# Patient Record
Sex: FEMALE | Race: BLACK OR AFRICAN AMERICAN | NOT HISPANIC OR LATINO | URBAN - METROPOLITAN AREA
[De-identification: names, ages, dates, MRNs, and addresses within clinical notes are randomized per-mention and may not be internally consistent; named-entity substitution may affect disease eponyms.]

---

## 2020-04-18 ENCOUNTER — INPATIENT (INPATIENT)
Facility: HOSPITAL | Age: 70
LOS: 2 days | Discharge: HOME | End: 2020-04-21
Attending: UROLOGY | Admitting: UROLOGY
Payer: COMMERCIAL

## 2020-04-18 VITALS
HEART RATE: 96 BPM | RESPIRATION RATE: 18 BRPM | SYSTOLIC BLOOD PRESSURE: 167 MMHG | OXYGEN SATURATION: 99 % | TEMPERATURE: 98 F | DIASTOLIC BLOOD PRESSURE: 98 MMHG

## 2020-04-18 LAB
BASOPHILS # BLD AUTO: 0.04 K/UL — SIGNIFICANT CHANGE UP (ref 0–0.2)
BASOPHILS NFR BLD AUTO: 0.7 % — SIGNIFICANT CHANGE UP (ref 0–2)
EOSINOPHIL # BLD AUTO: 0.02 K/UL — SIGNIFICANT CHANGE UP (ref 0–0.5)
EOSINOPHIL NFR BLD AUTO: 0.4 % — SIGNIFICANT CHANGE UP (ref 0–6)
HCT VFR BLD CALC: 40.3 % — SIGNIFICANT CHANGE UP (ref 34.5–45)
HGB BLD-MCNC: 12.9 G/DL — SIGNIFICANT CHANGE UP (ref 11.5–15.5)
IMM GRANULOCYTES NFR BLD AUTO: 0.4 % — SIGNIFICANT CHANGE UP (ref 0–1.5)
LYMPHOCYTES # BLD AUTO: 1.34 K/UL — SIGNIFICANT CHANGE UP (ref 1–3.3)
LYMPHOCYTES # BLD AUTO: 23.5 % — SIGNIFICANT CHANGE UP (ref 13–44)
MCHC RBC-ENTMCNC: 27.4 PG — SIGNIFICANT CHANGE UP (ref 27–34)
MCHC RBC-ENTMCNC: 32 % — SIGNIFICANT CHANGE UP (ref 32–36)
MCV RBC AUTO: 85.6 FL — SIGNIFICANT CHANGE UP (ref 80–100)
MONOCYTES # BLD AUTO: 0.47 K/UL — SIGNIFICANT CHANGE UP (ref 0–0.9)
MONOCYTES NFR BLD AUTO: 8.2 % — SIGNIFICANT CHANGE UP (ref 2–14)
NEUTROPHILS # BLD AUTO: 3.82 K/UL — SIGNIFICANT CHANGE UP (ref 1.8–7.4)
NEUTROPHILS NFR BLD AUTO: 66.8 % — SIGNIFICANT CHANGE UP (ref 43–77)
NRBC # FLD: 0 K/UL — SIGNIFICANT CHANGE UP (ref 0–0)
PLATELET # BLD AUTO: 221 K/UL — SIGNIFICANT CHANGE UP (ref 150–400)
PMV BLD: 11.5 FL — SIGNIFICANT CHANGE UP (ref 7–13)
RBC # BLD: 4.71 M/UL — SIGNIFICANT CHANGE UP (ref 3.8–5.2)
RBC # FLD: 15.4 % — HIGH (ref 10.3–14.5)
WBC # BLD: 5.71 K/UL — SIGNIFICANT CHANGE UP (ref 3.8–10.5)
WBC # FLD AUTO: 5.71 K/UL — SIGNIFICANT CHANGE UP (ref 3.8–10.5)

## 2020-04-18 PROCEDURE — 71046 X-RAY EXAM CHEST 2 VIEWS: CPT | Mod: 26

## 2020-04-18 RX ORDER — ASPIRIN/CALCIUM CARB/MAGNESIUM 324 MG
325 TABLET ORAL ONCE
Refills: 0 | Status: COMPLETED | OUTPATIENT
Start: 2020-04-18 | End: 2020-04-18

## 2020-04-18 RX ADMIN — Medication 325 MILLIGRAM(S): at 23:52

## 2020-04-18 NOTE — ED PROVIDER NOTE - PHYSICAL EXAMINATION
Physical Exam:    I have reviewed the triage vital signs.    Gen: NAD, AOx3, non-toxic appearing, able to ambulate without assistance  Head and Neck: NCAT, Neck supple without meningismus   HEENT: EOMI, PEERLA, normal conjunctiva, tongue midline, oral mucosa moist  Lung: CTAB, dyspneic, no wheezes/rhonchi/rales B/L, speaking in short sentences  CV: RRR, no murmurs, rubs or gallops  Abd: soft, NT, ND, no guarding, no rigidity, no rebound tenderness, no CVA tenderness, no masses.   MSK: no gross deformities, ROM normal in UE/LE, no back tenderness  Neuro: CNs II-XII grossly intact. No focal sensory or motor deficits  Skin: Warm, well perfused, no rash, no leg swelling  Psych: Appropriate mood and affect

## 2020-04-18 NOTE — ED ADULT TRIAGE NOTE - CHIEF COMPLAINT QUOTE
Pt arrives to ED c/o dizziness, CP and SOB since Wednesday.  Pt saturating 99% on room air.  EKG in triage.

## 2020-04-18 NOTE — ED PROVIDER NOTE - PROGRESS NOTE DETAILS
Annie HOWARD: pt with mild trop leak with normal creatinine. based on bedside echo showing dilated cardiomyopathy and reduced EF, added on a probnp. CXR showing mild pulmonary edema. No lasix given since patient currently asymptomatic and not hypoxic. d/w hospitalist dr. quintana who agreed with telemetry admission for stress and echo.

## 2020-04-18 NOTE — ED PROVIDER NOTE - RAPID ASSESSMENT
69 y/o female with pmhx of DM, HTN presents to ED c/o chest pain, palpitations, SUBRAMANIAN, dizziness x 3 days. Chest pain radiates down left arm. Gets SOB with exertion. Intermittent pain. Did not take ASA today. No fever, chills, cough. No le edema, calf pain, hx of dvt or pe. Does not have cardiologist. Hx of normal ekg in the past. EKG in ED with left BBB and T wave inversions in lateral leads I, AVF, V6. concern for ACS- will escalate care from RW to main ED, place on tele monitor, check labs, trop, CK and CKMB, cxr, provide ASA. pt assigned to Dr. Nicole Lackey. 69 y/o female with pmhx of DM, HTN presents to ED c/o chest pain, palpitations, SUBRAMANIAN, dizziness x 3 days. Chest pain radiates down left arm. Gets SOB with exertion. Intermittent pain. Did not take ASA today. No fever, chills, cough. No le edema, calf pain, hx of dvt or pe. Does not have cardiologist. Hx of normal ekg in the past. pt hemodynamically stable, well appearing. NAD. EKG in ED with left BBB and T wave inversions in lateral leads I, AVF, V6. concern for ACS- will escalate care from RW to main ED, place on tele monitor, check labs, trop, CK and CKMB, cxr, provide ASA. pt assigned to Dr. Nicole Lackey.

## 2020-04-18 NOTE — ED PROVIDER NOTE - OBJECTIVE STATEMENT
69 y/o female with pmhx of DM, HTN presents to ED c/o chest pain, palpitations, SUBRAMANIAN, dizziness x 3 days. Chest pain radiates down left arm. Gets SOB with exertion. Intermittent pain. Did not take ASA today. No fever, chills, cough. No le edema, calf pain, hx of dvt or pe. Does not have cardiologist. No n/v/d. No fever chills.

## 2020-04-18 NOTE — ED PROVIDER NOTE - ATTENDING CONTRIBUTION TO CARE
Lackey: 70 yof complaining of intermittent chest pain, radiating down the left arm, worse with exertion, occasionally feels sweaty as well and lightheaded. Pt has had no previous workup of stress or echo. Pt takes Metformin and Losartan. Pt initially stated she had syncope but then stated she "came close." As far as pt is aware, pt has a normal EKG. Today shows left bundle, vitals stable and PE is unremarkable as mentioned above. RRR, lungs clear, no pitting edema, no jvd, pulses equal. EKG is abnormal and pt should have a cardiac workup. Labs and CXR pending.

## 2020-04-18 NOTE — ED PROVIDER NOTE - CLINICAL SUMMARY MEDICAL DECISION MAKING FREE TEXT BOX
Bravo: Adult female DM and HTN with chest pain, palpitations, near syncope, SUBRAMANIAN. No old ekg. Never echo or ekg.  Plan: ACS work up: asa, ekg, troponin. Reassess.

## 2020-04-19 ENCOUNTER — TRANSCRIPTION ENCOUNTER (OUTPATIENT)
Age: 70
End: 2020-04-19

## 2020-04-19 DIAGNOSIS — R94.31 ABNORMAL ELECTROCARDIOGRAM [ECG] [EKG]: ICD-10-CM

## 2020-04-19 DIAGNOSIS — Z02.9 ENCOUNTER FOR ADMINISTRATIVE EXAMINATIONS, UNSPECIFIED: ICD-10-CM

## 2020-04-19 DIAGNOSIS — R07.9 CHEST PAIN, UNSPECIFIED: ICD-10-CM

## 2020-04-19 DIAGNOSIS — Z29.9 ENCOUNTER FOR PROPHYLACTIC MEASURES, UNSPECIFIED: ICD-10-CM

## 2020-04-19 DIAGNOSIS — E11.65 TYPE 2 DIABETES MELLITUS WITH HYPERGLYCEMIA: ICD-10-CM

## 2020-04-19 DIAGNOSIS — I10 ESSENTIAL (PRIMARY) HYPERTENSION: ICD-10-CM

## 2020-04-19 DIAGNOSIS — R74.0 NONSPECIFIC ELEVATION OF LEVELS OF TRANSAMINASE AND LACTIC ACID DEHYDROGENASE [LDH]: ICD-10-CM

## 2020-04-19 LAB
ALBUMIN SERPL ELPH-MCNC: 4.1 G/DL — SIGNIFICANT CHANGE UP (ref 3.3–5)
ALP SERPL-CCNC: 111 U/L — SIGNIFICANT CHANGE UP (ref 40–120)
ALT FLD-CCNC: 110 U/L — HIGH (ref 4–33)
ANION GAP SERPL CALC-SCNC: 11 MMO/L — SIGNIFICANT CHANGE UP (ref 7–14)
ANION GAP SERPL CALC-SCNC: 14 MMO/L — SIGNIFICANT CHANGE UP (ref 7–14)
APTT BLD: 22.6 SEC — LOW (ref 27.5–36.3)
AST SERPL-CCNC: 150 U/L — HIGH (ref 4–32)
BASOPHILS # BLD AUTO: 0.07 K/UL — SIGNIFICANT CHANGE UP (ref 0–0.2)
BASOPHILS NFR BLD AUTO: 1.1 % — SIGNIFICANT CHANGE UP (ref 0–2)
BILIRUB SERPL-MCNC: 0.5 MG/DL — SIGNIFICANT CHANGE UP (ref 0.2–1.2)
BUN SERPL-MCNC: 14 MG/DL — SIGNIFICANT CHANGE UP (ref 7–23)
BUN SERPL-MCNC: 16 MG/DL — SIGNIFICANT CHANGE UP (ref 7–23)
CALCIUM SERPL-MCNC: 9.6 MG/DL — SIGNIFICANT CHANGE UP (ref 8.4–10.5)
CALCIUM SERPL-MCNC: 9.8 MG/DL — SIGNIFICANT CHANGE UP (ref 8.4–10.5)
CHLORIDE SERPL-SCNC: 100 MMOL/L — SIGNIFICANT CHANGE UP (ref 98–107)
CHLORIDE SERPL-SCNC: 103 MMOL/L — SIGNIFICANT CHANGE UP (ref 98–107)
CHOLEST SERPL-MCNC: 159 MG/DL — SIGNIFICANT CHANGE UP (ref 120–199)
CK MB BLD-MCNC: 2.2 — SIGNIFICANT CHANGE UP (ref 0–2.5)
CK MB BLD-MCNC: 3.54 NG/ML — SIGNIFICANT CHANGE UP (ref 1–4.7)
CK MB BLD-MCNC: 5.92 NG/ML — HIGH (ref 1–4.7)
CK SERPL-CCNC: 104 U/L — SIGNIFICANT CHANGE UP (ref 25–170)
CK SERPL-CCNC: 161 U/L — SIGNIFICANT CHANGE UP (ref 25–170)
CO2 SERPL-SCNC: 22 MMOL/L — SIGNIFICANT CHANGE UP (ref 22–31)
CO2 SERPL-SCNC: 25 MMOL/L — SIGNIFICANT CHANGE UP (ref 22–31)
CREAT SERPL-MCNC: 0.77 MG/DL — SIGNIFICANT CHANGE UP (ref 0.5–1.3)
CREAT SERPL-MCNC: 0.82 MG/DL — SIGNIFICANT CHANGE UP (ref 0.5–1.3)
EOSINOPHIL # BLD AUTO: 0.08 K/UL — SIGNIFICANT CHANGE UP (ref 0–0.5)
EOSINOPHIL NFR BLD AUTO: 1.3 % — SIGNIFICANT CHANGE UP (ref 0–6)
GLUCOSE BLDC GLUCOMTR-MCNC: 102 MG/DL — HIGH (ref 70–99)
GLUCOSE BLDC GLUCOMTR-MCNC: 108 MG/DL — HIGH (ref 70–99)
GLUCOSE BLDC GLUCOMTR-MCNC: 144 MG/DL — HIGH (ref 70–99)
GLUCOSE BLDC GLUCOMTR-MCNC: 198 MG/DL — HIGH (ref 70–99)
GLUCOSE SERPL-MCNC: 116 MG/DL — HIGH (ref 70–99)
GLUCOSE SERPL-MCNC: 169 MG/DL — HIGH (ref 70–99)
HAV IGM SER-ACNC: NONREACTIVE — SIGNIFICANT CHANGE UP
HBA1C BLD-MCNC: 6.3 % — HIGH (ref 4–5.6)
HBV CORE IGM SER-ACNC: NONREACTIVE — SIGNIFICANT CHANGE UP
HBV SURFACE AG SER-ACNC: NONREACTIVE — SIGNIFICANT CHANGE UP
HCT VFR BLD CALC: 41.8 % — SIGNIFICANT CHANGE UP (ref 34.5–45)
HCV AB S/CO SERPL IA: 0.22 S/CO — SIGNIFICANT CHANGE UP (ref 0–0.99)
HCV AB SERPL-IMP: SIGNIFICANT CHANGE UP
HDLC SERPL-MCNC: 75 MG/DL — HIGH (ref 45–65)
HGB BLD-MCNC: 13.3 G/DL — SIGNIFICANT CHANGE UP (ref 11.5–15.5)
IMM GRANULOCYTES NFR BLD AUTO: 0.2 % — SIGNIFICANT CHANGE UP (ref 0–1.5)
INR BLD: 1.01 — SIGNIFICANT CHANGE UP (ref 0.88–1.17)
LIPID PNL WITH DIRECT LDL SERPL: 84 MG/DL — SIGNIFICANT CHANGE UP
LYMPHOCYTES # BLD AUTO: 2.38 K/UL — SIGNIFICANT CHANGE UP (ref 1–3.3)
LYMPHOCYTES # BLD AUTO: 37.2 % — SIGNIFICANT CHANGE UP (ref 13–44)
MAGNESIUM SERPL-MCNC: 2.3 MG/DL — SIGNIFICANT CHANGE UP (ref 1.6–2.6)
MCHC RBC-ENTMCNC: 27.4 PG — SIGNIFICANT CHANGE UP (ref 27–34)
MCHC RBC-ENTMCNC: 31.8 % — LOW (ref 32–36)
MCV RBC AUTO: 86.2 FL — SIGNIFICANT CHANGE UP (ref 80–100)
MONOCYTES # BLD AUTO: 0.44 K/UL — SIGNIFICANT CHANGE UP (ref 0–0.9)
MONOCYTES NFR BLD AUTO: 6.9 % — SIGNIFICANT CHANGE UP (ref 2–14)
NEUTROPHILS # BLD AUTO: 3.42 K/UL — SIGNIFICANT CHANGE UP (ref 1.8–7.4)
NEUTROPHILS NFR BLD AUTO: 53.3 % — SIGNIFICANT CHANGE UP (ref 43–77)
NRBC # FLD: 0 K/UL — SIGNIFICANT CHANGE UP (ref 0–0)
NT-PROBNP SERPL-SCNC: 6768 PG/ML — SIGNIFICANT CHANGE UP
NT-PROBNP SERPL-SCNC: 8555 PG/ML — SIGNIFICANT CHANGE UP
PHOSPHATE SERPL-MCNC: 3.9 MG/DL — SIGNIFICANT CHANGE UP (ref 2.5–4.5)
PLATELET # BLD AUTO: 239 K/UL — SIGNIFICANT CHANGE UP (ref 150–400)
PMV BLD: 11.6 FL — SIGNIFICANT CHANGE UP (ref 7–13)
POTASSIUM SERPL-MCNC: 3.6 MMOL/L — SIGNIFICANT CHANGE UP (ref 3.5–5.3)
POTASSIUM SERPL-MCNC: 5.6 MMOL/L — HIGH (ref 3.5–5.3)
POTASSIUM SERPL-SCNC: 3.6 MMOL/L — SIGNIFICANT CHANGE UP (ref 3.5–5.3)
POTASSIUM SERPL-SCNC: 5.6 MMOL/L — HIGH (ref 3.5–5.3)
PROT SERPL-MCNC: 7.9 G/DL — SIGNIFICANT CHANGE UP (ref 6–8.3)
PROTHROM AB SERPL-ACNC: 11.5 SEC — SIGNIFICANT CHANGE UP (ref 9.8–13.1)
RBC # BLD: 4.85 M/UL — SIGNIFICANT CHANGE UP (ref 3.8–5.2)
RBC # FLD: 15.3 % — HIGH (ref 10.3–14.5)
SODIUM SERPL-SCNC: 133 MMOL/L — LOW (ref 135–145)
SODIUM SERPL-SCNC: 142 MMOL/L — SIGNIFICANT CHANGE UP (ref 135–145)
TRIGL SERPL-MCNC: 66 MG/DL — SIGNIFICANT CHANGE UP (ref 10–149)
TROPONIN T, HIGH SENSITIVITY: 18 NG/L — SIGNIFICANT CHANGE UP (ref ?–14)
TROPONIN T, HIGH SENSITIVITY: 54 NG/L — CRITICAL HIGH (ref ?–14)
TROPONIN T, HIGH SENSITIVITY: 74 NG/L — CRITICAL HIGH (ref ?–14)
TSH SERPL-MCNC: 1.9 UIU/ML — SIGNIFICANT CHANGE UP (ref 0.27–4.2)
TSH SERPL-MCNC: 2.37 UIU/ML — SIGNIFICANT CHANGE UP (ref 0.27–4.2)
WBC # BLD: 6.4 K/UL — SIGNIFICANT CHANGE UP (ref 3.8–10.5)
WBC # FLD AUTO: 6.4 K/UL — SIGNIFICANT CHANGE UP (ref 3.8–10.5)

## 2020-04-19 PROCEDURE — 99223 1ST HOSP IP/OBS HIGH 75: CPT

## 2020-04-19 PROCEDURE — 99232 SBSQ HOSP IP/OBS MODERATE 35: CPT

## 2020-04-19 PROCEDURE — 93306 TTE W/DOPPLER COMPLETE: CPT | Mod: 26

## 2020-04-19 RX ORDER — DEXTROSE 50 % IN WATER 50 %
15 SYRINGE (ML) INTRAVENOUS ONCE
Refills: 0 | Status: DISCONTINUED | OUTPATIENT
Start: 2020-04-19 | End: 2020-04-21

## 2020-04-19 RX ORDER — INSULIN LISPRO 100/ML
VIAL (ML) SUBCUTANEOUS
Refills: 0 | Status: DISCONTINUED | OUTPATIENT
Start: 2020-04-19 | End: 2020-04-21

## 2020-04-19 RX ORDER — ASPIRIN/CALCIUM CARB/MAGNESIUM 324 MG
81 TABLET ORAL DAILY
Refills: 0 | Status: DISCONTINUED | OUTPATIENT
Start: 2020-04-19 | End: 2020-04-21

## 2020-04-19 RX ORDER — HEPARIN SODIUM 5000 [USP'U]/ML
5000 INJECTION INTRAVENOUS; SUBCUTANEOUS EVERY 12 HOURS
Refills: 0 | Status: DISCONTINUED | OUTPATIENT
Start: 2020-04-19 | End: 2020-04-19

## 2020-04-19 RX ORDER — CLOPIDOGREL BISULFATE 75 MG/1
75 TABLET, FILM COATED ORAL DAILY
Refills: 0 | Status: DISCONTINUED | OUTPATIENT
Start: 2020-04-20 | End: 2020-04-21

## 2020-04-19 RX ORDER — DEXTROSE 50 % IN WATER 50 %
25 SYRINGE (ML) INTRAVENOUS ONCE
Refills: 0 | Status: DISCONTINUED | OUTPATIENT
Start: 2020-04-19 | End: 2020-04-21

## 2020-04-19 RX ORDER — INSULIN LISPRO 100/ML
VIAL (ML) SUBCUTANEOUS AT BEDTIME
Refills: 0 | Status: DISCONTINUED | OUTPATIENT
Start: 2020-04-19 | End: 2020-04-21

## 2020-04-19 RX ORDER — SODIUM CHLORIDE 9 MG/ML
1000 INJECTION, SOLUTION INTRAVENOUS
Refills: 0 | Status: DISCONTINUED | OUTPATIENT
Start: 2020-04-19 | End: 2020-04-21

## 2020-04-19 RX ORDER — DEXTROSE 50 % IN WATER 50 %
12.5 SYRINGE (ML) INTRAVENOUS ONCE
Refills: 0 | Status: DISCONTINUED | OUTPATIENT
Start: 2020-04-19 | End: 2020-04-21

## 2020-04-19 RX ORDER — LOSARTAN POTASSIUM 100 MG/1
50 TABLET, FILM COATED ORAL DAILY
Refills: 0 | Status: DISCONTINUED | OUTPATIENT
Start: 2020-04-19 | End: 2020-04-21

## 2020-04-19 RX ORDER — FUROSEMIDE 40 MG
20 TABLET ORAL ONCE
Refills: 0 | Status: COMPLETED | OUTPATIENT
Start: 2020-04-19 | End: 2020-04-19

## 2020-04-19 RX ORDER — CLOPIDOGREL BISULFATE 75 MG/1
300 TABLET, FILM COATED ORAL ONCE
Refills: 0 | Status: COMPLETED | OUTPATIENT
Start: 2020-04-19 | End: 2020-04-19

## 2020-04-19 RX ORDER — GLUCAGON INJECTION, SOLUTION 0.5 MG/.1ML
1 INJECTION, SOLUTION SUBCUTANEOUS ONCE
Refills: 0 | Status: DISCONTINUED | OUTPATIENT
Start: 2020-04-19 | End: 2020-04-21

## 2020-04-19 RX ORDER — HEPARIN SODIUM 5000 [USP'U]/ML
5000 INJECTION INTRAVENOUS; SUBCUTANEOUS EVERY 12 HOURS
Refills: 0 | Status: DISCONTINUED | OUTPATIENT
Start: 2020-04-19 | End: 2020-04-21

## 2020-04-19 RX ADMIN — HEPARIN SODIUM 5000 UNIT(S): 5000 INJECTION INTRAVENOUS; SUBCUTANEOUS at 06:22

## 2020-04-19 RX ADMIN — CLOPIDOGREL BISULFATE 300 MILLIGRAM(S): 75 TABLET, FILM COATED ORAL at 11:35

## 2020-04-19 RX ADMIN — Medication 1: at 17:46

## 2020-04-19 RX ADMIN — HEPARIN SODIUM 5000 UNIT(S): 5000 INJECTION INTRAVENOUS; SUBCUTANEOUS at 17:47

## 2020-04-19 RX ADMIN — Medication 81 MILLIGRAM(S): at 11:35

## 2020-04-19 RX ADMIN — Medication 20 MILLIGRAM(S): at 04:20

## 2020-04-19 NOTE — DISCHARGE NOTE PROVIDER - NSDCMRMEDTOKEN_GEN_ALL_CORE_FT
Aspirin Enteric Coated 81 mg oral delayed release tablet: 1 tab(s) orally once a day  losartan 50 mg oral tablet: 1 tab(s) orally once a day  metFORMIN 500 mg oral tablet: 1 tab(s) orally 2 times a day Aspirin Enteric Coated 81 mg oral delayed release tablet: 1 tab(s) orally once a day  atorvastatin 40 mg oral tablet: 1 tab(s) orally once a day (at bedtime)  metFORMIN 500 mg oral tablet: 1 tab(s) orally 2 times a day

## 2020-04-19 NOTE — DISCHARGE NOTE PROVIDER - NSDCCPCAREPLAN_GEN_ALL_CORE_FT
PRINCIPAL DISCHARGE DIAGNOSIS  Diagnosis: Chest pain  Assessment and Plan of Treatment: PRINCIPAL DISCHARGE DIAGNOSIS  Diagnosis: Chest pain  Assessment and Plan of Treatment: You were having chest pain due to a lack of blood flow to the heart muscle, as indicated by the laboratory tests. You had an echocardiogram which showed that the heart muscle is severely weakened. You were treated with aspirin and clopidogrel (Plavix). Please continue to take your medications as prescribed and follow up with your cardiologist and your primary care doctor. PRINCIPAL DISCHARGE DIAGNOSIS  Diagnosis: Chest pain  Assessment and Plan of Treatment: You were having chest pain due to a lack of blood flow to the heart muscle, as indicated by the laboratory tests. You had an echocardiogram which showed that the heart muscle is severely weakened. You were treated with aspirin and clopidogrel (Plavix). Please continue to take your medications as prescribed and follow up with your cardiologist and your primary care doctor. Please follow up within a week, with cardiology, please call [phone] to schedule an appointment

## 2020-04-19 NOTE — H&P ADULT - PROBLEM SELECTOR PLAN 4
AST: 150, ALT: 110 possibly 2/2 to hepatic congestion from underline fluid overload/CHF  Will check acute hepatitis panel

## 2020-04-19 NOTE — ED PROCEDURE NOTE - PROCEDURE ADDITIONAL DETAILS
Limited cardiac ultrasound show decreased LV function with EF appx 25%, small pericardial effusion with no evidence of tamponade, dilated LV 6cm, dilated LA 5cm and no evidence of RV dilation or strain. There is also moderate to severe MR and small TR. EPSS is over 2cm. Ultrasound findings concerning for dilated cardiomyopathy. See images and report in chart.  Shiv [id number]

## 2020-04-19 NOTE — H&P ADULT - NEGATIVE OPHTHALMOLOGIC SYMPTOMS
no lacrimation R/no blurred vision L/no blurred vision R/no lacrimation L/no discharge R/no diplopia/no photophobia/no discharge L

## 2020-04-19 NOTE — H&P ADULT - PROBLEM SELECTOR PLAN 3
Monitor on telemetry for now    Low sodium diet, daily weights, monitor I's and O's, fluid restrictions 1000cc/day  Check BNP in 48 hours   Will give 1x dose of IV Lasix 20mg  and then re-evaluate   TTE ordered

## 2020-04-19 NOTE — PROGRESS NOTE ADULT - PROBLEM SELECTOR PLAN 3
AST: 150, ALT: 110 possibly 2/2 to hepatic congestion from underline fluid overload/CHF  - f/u acute hepatitis panel

## 2020-04-19 NOTE — H&P ADULT - NEGATIVE NEUROLOGICAL SYMPTOMS
no confusion/no focal seizures/no hemiparesis/no transient paralysis/no weakness/no paresthesias/no generalized seizures/no vertigo/no loss of sensation/no difficulty walking/no loss of consciousness/no syncope/no tremors/no headache

## 2020-04-19 NOTE — H&P ADULT - PROBLEM SELECTOR PLAN 1
Patient with EKG showing LBBB(negative for sgarbossa criteria). HsT x 1 was 18 and patient currently chest pain free. Patient s/p Aspirin 325mg in the ED   Will monitor on telemetry, serial EKG and Justino prn for any episodes of chest pain   HgbA1C, lipid profile, CBC, CMP in am   TTE ordered   House cardiology called by attending and awaiting their recommendation  Started on Aspirin 81mg daily   Consider ischemic workup with NST vs LHC in am Patient with EKG showing LBBB(negative for sgarbossa criteria). HsT x 1 was 18 and patient currently chest pain free. Patient s/p Aspirin 325mg in the ED   Will monitor on telemetry, serial EKG and Justino prn for any episodes of chest pain. Repeat HsT went from 18 to 54. Spoke with CCU NP and recommended to trend troponin for now(hold off on heparin drip).    HgbA1C, lipid profile, CBC, CMP in am   TTE ordered   House cardiology called by attending and awaiting their recommendations   Started on Aspirin 81mg daily   Consider ischemic workup with NST vs LHC in am Patient with EKG showing LBBB(negative for sgarbossa criteria). HsT x 1 was 18 and patient currently chest pain free. Patient s/p Aspirin 325mg in the ED   Will monitor on telemetry, serial EKG and Justino prn for any episodes of chest pain. Repeat HsT went from 18 to 54. Spoke with CCU NP and recommended to trend troponin for now(hold off on heparin drip).  Although aleshia score is sufficiently elevated to justify use of heparin, Cardiology recommending against heparin drip pending repeat enzymes for the following reasons: atypical nature of chest pain, normal CKMB, and current asymptomatic state.  HgbA1C, lipid profile, CBC, CMP in am   TTE ordered   House cardiology called by attending and awaiting their recommendations   Started on Aspirin 81mg daily   Consider ischemic workup with NST vs LHC in am

## 2020-04-19 NOTE — PROGRESS NOTE ADULT - SUBJECTIVE AND OBJECTIVE BOX
PROGRESS NOTE:   Authored by Femi Bae MD PGY-1  Pager [phone] [facility], [address] [place] [facility]   Please page [phone] after 7PM (or after 12PM on weekends)    Patient is a 70y old  Female who presents with a chief complaint of Left sided chest pain, SOB and palpitations (19 Apr 2020 04:26)      SUBJECTIVE / OVERNIGHT EVENTS:    ADDITIONAL REVIEW OF SYSTEMS:    MEDICATIONS  (STANDING):  aspirin enteric coated 81 milliGRAM(s) Oral daily  dextrose 5%. 1000 milliLiter(s) (50 mL/Hr) IV Continuous <Continuous>  dextrose 50% Injectable 12.5 Gram(s) IV Push once  dextrose 50% Injectable 25 Gram(s) IV Push once  dextrose 50% Injectable 25 Gram(s) IV Push once  heparin   Injectable 5000 Unit(s) SubCutaneous every 12 hours  insulin lispro (HumaLOG) corrective regimen sliding scale   SubCutaneous three times a day before meals  insulin lispro (HumaLOG) corrective regimen sliding scale   SubCutaneous at bedtime  losartan 50 milliGRAM(s) Oral daily    MEDICATIONS  (PRN):  dextrose 40% Gel 15 Gram(s) Oral once PRN Blood Glucose LESS THAN 70 milliGRAM(s)/deciliter  glucagon  Injectable 1 milliGRAM(s) IntraMuscular once PRN Glucose LESS THAN 70 milligrams/deciliter    PHYSICAL EXAM:  Vital Signs Last 24 Hrs  T(C): 36.9 (19 Apr 2020 03:53), Max: 36.9 (19 Apr 2020 03:53)  T(F): 98.4 (19 Apr 2020 03:53), Max: 98.4 (19 Apr 2020 03:53)  HR: 62 (19 Apr 2020 06:22) (62 - 96)  BP: 122/75 (19 Apr 2020 06:22) (122/75 - 167/98)  BP(mean): --  RR: 14 (19 Apr 2020 03:53) (14 - 18)  SpO2: 100% (19 Apr 2020 03:53) (99% - 100%)    CONSTITUTIONAL: NAD, well-developed  RESPIRATORY: Normal respiratory effort; lungs are clear to auscultation bilaterally  CARDIOVASCULAR: Regular rate and rhythm, normal S1 and S2, no murmur/rub/gallop; No lower extremity edema; Peripheral pulses are 2+ bilaterally  ABDOMEN: Nontender to palpation, normoactive bowel sounds, no rebound/guarding; No hepatosplenomegaly  MUSCULOSKELETAL: no clubbing or cyanosis of digits; no joint swelling or tenderness to palpation  PSYCH: A+O to person, place, and time; affect appropriate    LABS:                        12.9   5.71  )-----------( 221      ( 18 Apr 2020 23:24 )             40.3     04-18    133<L>  |  100  |  16  ----------------------------<  169<H>  5.6<H>   |  22  |  0.82    Ca    9.6      18 Apr 2020 23:24    TPro  7.9  /  Alb  4.1  /  TBili  0.5  /  DBili  x   /  AST  150<H>  /  ALT  110<H>  /  AlkPhos  111  04-18    PT/INR - ( 18 Apr 2020 23:24 )   PT: 11.5 SEC;   INR: 1.01          PTT - ( 18 Apr 2020 23:24 )  PTT:22.6 SEC  CARDIAC MARKERS ( 18 Apr 2020 23:24 )  x     / x     / 161 u/L / 3.54 ng/mL / x          RADIOLOGY & ADDITIONAL TESTS:  Results Reviewed:   Imaging Personally Reviewed:  Electrocardiogram Personally Reviewed:    COORDINATION OF CARE:  Care Discussed with Consultants/Other Providers [Y/N]:  Prior or Outpatient Records Reviewed [Y/N]: PROGRESS NOTE:   Authored by Femi Bae MD PGY-1  Pager [phone] [facility], [address] [facility]   Please page [phone] after 7PM (or after 12PM on weekends)    Patient is a 70y old  Female who presents with a chief complaint of Left sided chest pain, SOB and palpitations (19 Apr 2020 04:26)      SUBJECTIVE / OVERNIGHT EVENTS:  Patient very fatigued this AM as she was not able to get much sleep (admitted overnight), answered a few questions before going back to sleep. She does not have any chest pain. Pt also denies fever, chills, nausea, vomiting, constipation, and diarrhea.    ADDITIONAL REVIEW OF SYSTEMS:    MEDICATIONS  (STANDING):  aspirin enteric coated 81 milliGRAM(s) Oral daily  dextrose 5%. 1000 milliLiter(s) (50 mL/Hr) IV Continuous <Continuous>  dextrose 50% Injectable 12.5 Gram(s) IV Push once  dextrose 50% Injectable 25 Gram(s) IV Push once  dextrose 50% Injectable 25 Gram(s) IV Push once  heparin   Injectable 5000 Unit(s) SubCutaneous every 12 hours  insulin lispro (HumaLOG) corrective regimen sliding scale   SubCutaneous three times a day before meals  insulin lispro (HumaLOG) corrective regimen sliding scale   SubCutaneous at bedtime  losartan 50 milliGRAM(s) Oral daily    MEDICATIONS  (PRN):  dextrose 40% Gel 15 Gram(s) Oral once PRN Blood Glucose LESS THAN 70 milliGRAM(s)/deciliter  glucagon  Injectable 1 milliGRAM(s) IntraMuscular once PRN Glucose LESS THAN 70 milligrams/deciliter    PHYSICAL EXAM:  Vital Signs Last 24 Hrs  T(C): 36.9 (19 Apr 2020 03:53), Max: 36.9 (19 Apr 2020 03:53)  T(F): 98.4 (19 Apr 2020 03:53), Max: 98.4 (19 Apr 2020 03:53)  HR: 62 (19 Apr 2020 06:22) (62 - 96)  BP: 122/75 (19 Apr 2020 06:22) (122/75 - 167/98)  BP(mean): --  RR: 14 (19 Apr 2020 03:53) (14 - 18)  SpO2: 100% (19 Apr 2020 03:53) (99% - 100%)    CONSTITUTIONAL: NAD, fatigued  RESPIRATORY: Normal respiratory effort; lungs are clear to auscultation bilaterally  CARDIOVASCULAR: Regular rate and rhythm, normal S1 and S2, no murmur/rub/gallop; No lower extremity edema; Peripheral pulses are 2+ bilaterally  ABDOMEN: Nontender to palpation, normoactive bowel sounds, no rebound/guarding; No hepatosplenomegaly  MUSCULOSKELETAL: no clubbing or cyanosis of digits; no joint swelling or tenderness to palpation  PSYCH: A+O to person, place, and time; affect appropriate    LABS:                        12.9   5.71  )-----------( 221      ( 18 Apr 2020 23:24 )             40.3     04-18    133<L>  |  100  |  16  ----------------------------<  169<H>  5.6<H>   |  22  |  0.82    Ca    9.6      18 Apr 2020 23:24    TPro  7.9  /  Alb  4.1  /  TBili  0.5  /  DBili  x   /  AST  150<H>  /  ALT  110<H>  /  AlkPhos  111  04-18    PT/INR - ( 18 Apr 2020 23:24 )   PT: 11.5 SEC;   INR: 1.01          PTT - ( 18 Apr 2020 23:24 )  PTT:22.6 SEC  CARDIAC MARKERS ( 18 Apr 2020 23:24 )  x     / x     / 161 u/L / 3.54 ng/mL / x          RADIOLOGY & ADDITIONAL TESTS:  Results Reviewed:   Imaging Personally Reviewed:  Electrocardiogram Personally Reviewed:    COORDINATION OF CARE:  Care Discussed with Consultants/Other Providers [Y/N]:  Prior or Outpatient Records Reviewed [Y/N]:

## 2020-04-19 NOTE — CONSULT NOTE ADULT - ASSESSMENT
70yoF w/ PMHx DM, HTN presents with left sided chest pain with radiation to left shoulder that occurred yesterday lasting for 1 hour not associated with any other symptoms.  Denies any cardiac history and during examination, patient disinterested in providing more patient history.  Currently denies any chest pain and states her symptoms have resolved.  First hsT 18 >> 52.  EKG LBBB (no old EKG to compare it to), does not meet sgarbossa criteria.

## 2020-04-19 NOTE — PROGRESS NOTE ADULT - PROBLEM SELECTOR PLAN 2
Monitor on telemetry for now    - Daily weights, strict I/O's  - Check BNP in 48 hours   - s/p Lasix 20mg IVP x1    - TTE ordered

## 2020-04-19 NOTE — H&P ADULT - GASTROINTESTINAL DETAILS
soft/nontender/bowel sounds normal/no guarding/no rebound tenderness/no rigidity/normal/no distention

## 2020-04-19 NOTE — PROGRESS NOTE ADULT - PROBLEM SELECTOR PLAN 1
EKG showing LBBB(negative for sgarbossa criteria). HsT initially 18 and pt currently chest pain free. Repeat HsT went from 18 to 54.   - ROMAIN score suggests hep gtt, but given atypical nature of chest pain, normal CKMB, and current lack of symptoms, hold off on hep gtt per cardiology  - s/p Aspirin 325mg in the ED, c/w ASA 81mg qd   - Monitor on tele, serial EKG and Justino prn for any episodes of chest pain.     - Per cardiology, trend troponin for now(hold off on heparin drip).  - f/u HbA1c, lipid profile, CBC, CMP   - TTE ordered   - Will need nuclear stress test in the future   - Cardiology following; appreciate recs EKG showing LBBB (negative for Sgarbossa criteria). HsT initially 18 and pt currently chest pain free. Repeat HsT went from 18 to 54.   - ROMAIN score suggests hep gtt, but given atypical nature of chest pain, normal CKMB, and current lack of symptoms, hold off on hep gtt per cardiology  - s/p Aspirin 325mg in the ED, c/w ASA 81mg qd   - Monitor on tele, serial EKG and Justino prn for any episodes of chest pain.     - Per cardiology, trend troponin for now(hold off on heparin drip).  - f/u HbA1c, lipid profile, CBC, CMP   - TTE ordered   - Will need nuclear stress test in the future   - HsT uptrending 74 <-- 54 <-- 18 but no chest pain. If develops chest pain, will load w/ ASA, Plavix, and heparin.  - Cardiology following; appreciate recs

## 2020-04-19 NOTE — H&P ADULT - ASSESSMENT
69 y/o F with PMH of DM type II, HTN presents to ED with the compliant of left sided chest pain, palpitations, SUBRAMANIAN since Wednesday. R/o ACS

## 2020-04-19 NOTE — DISCHARGE NOTE PROVIDER - CARE PROVIDER_API CALL
Ambrocio Sommer (MD; PhD)  Cardiology; Internal Medicine; Vascular Medicine  [address] [place] [address], [facility] [address] [place] [address]  Phone: [phone]  Fax: [phone]  Follow Up Time: 1 week

## 2020-04-19 NOTE — CONSULT NOTE ADULT - ATTENDING COMMENTS
pt with cardiac risk factors who reports an episode of palpitations leading to dizziness, fall, and chest pain.    hs troponin elevated, however CK wnl and CKMB minimally elevated with non significant relative index. remains chest pain free since initial episode. cardiac workup done one year ago reportedly wnl. exam significant for mild systolic murmur, mild-mod JVP elevation. pt does report hx of one week of increased ET.    suspect this may have been a tachyarrhythmia with a component of mild diastolic dysfunction    -check TTE  -trend cardiac enzymes  -continue asa/clopidogrel for now  -start atorvastatin 40mg daily  -IV furosemide 20mg x 1  -cont losartan. add metoprolol tartrate 12.5mg BID, hold for SBP<100 or HR<60.  -if TTE without significant evidence of ischemic heart disease, enzymes plateau, and pt remains without anginal symptoms, pt will need an outpt nuclear stress test and evaluation for cardiac event monitor. pt with cardiac risk factors who reports an episode of palpitations leading to dizziness, fall, and chest pain.    hs troponin elevated, however CK wnl and CKMB minimally elevated with non significant relative index. remains chest pain free since initial episode. cardiac workup done one year ago reportedly wnl. exam significant for mild systolic murmur, mild-mod JVP elevation. pt does report hx of one week of increased ET.    suspect this may have been a tachyarrhythmia with a component of mild diastolic dysfunction. BNP elevated.    -check TTE  -trend cardiac enzymes  -continue asa/clopidogrel for now  -start atorvastatin 40mg daily  -IV furosemide 20mg x 1  -cont losartan. add metoprolol tartrate 12.5mg BID, hold for SBP<100 or HR<60.  -if TTE without significant evidence of ischemic heart disease, enzymes plateau, and pt remains without anginal symptoms, pt will need an outpt nuclear stress test and evaluation for cardiac event monitor.

## 2020-04-19 NOTE — H&P ADULT - ATTENDING COMMENTS
71 yo f h/o DM2, HTN with suspected ACS, bedside TTE with EF25??. Although aleshia score is sufficiently elevated to justify use of heparin, cardiology recommending against heparin drip pending repeat enzymes for the following reasons: atypical nature of chest pain, normal CKMB, and current asymptomatic state. Continue tele, trend enzymes, TTE, daily aspirin. Low modified wells score 69 yo f h/o DM2, HTN with suspected ACS, bedside TTE with EF25??. Although aleshia score is sufficiently elevated to justify use of heparin, cardiology recommending against heparin drip pending repeat enzymes for the following reasons: atypical nature of chest pain, normal CKMB, and current asymptomatic state. Continue tele, trend enzymes, TTE, daily aspirin. Low modified wells score. Will require stress test

## 2020-04-19 NOTE — ED ADULT NURSE NOTE - OBJECTIVE STATEMENT
Pt c/o cp with palpitations and dypsnea on exertion x 3 days.  EKG shows new lt BBB and t-wave inversions.

## 2020-04-19 NOTE — H&P ADULT - NSHPLABSRESULTS_GEN_ALL_CORE
12.9   5.71  )-----------( 221      ( 18 Apr 2020 23:24 )             40.3     04-18    133<L>  |  100  |  16  ----------------------------<  169<H>  5.6<H>   |  22  |  0.82    Ca    9.6      18 Apr 2020 23:24    TPro  7.9  /  Alb  4.1  /  TBili  0.5  /  DBili  x   /  AST  150<H>  /  ALT  110<H>  /  AlkPhos  111  04-18    Prelim CXR: There is blunting of the perihilar vessels concerning for mild pulmonary edema. Small left pleural effusion.    EKG: NSR at a rate of 92 with possible LAE, LBBB with QTc of 504

## 2020-04-19 NOTE — PROGRESS NOTE ADULT - PROBLEM SELECTOR PLAN 6
DVT ppx: SQH 5000U q12h  Diet: Low sodium, Fluid restriction 1L DVT ppx: SQH 5000U q12h  Diet: CC, DASH/TLC, low sodium, Fluid restriction 1L

## 2020-04-19 NOTE — H&P ADULT - NEGATIVE ENMT SYMPTOMS
no hearing difficulty/no vertigo/no sinus symptoms/no nasal congestion/no ear pain/no tinnitus/no nasal discharge

## 2020-04-19 NOTE — H&P ADULT - RS GEN PE MLT RESP DETAILS PC
no chest wall tenderness/rales/normal/airway patent/respirations non-labored/no rhonchi/no wheezes/no intercostal retractions

## 2020-04-19 NOTE — H&P ADULT - HISTORY OF PRESENT ILLNESS
69 y/o F with PMH of DM type II, HTN presents to ED with the compliant of left sided chest pain, palpitations, SUBRAMANIAN since Wednesday. As per the patient she was in her usual state of health and developed left sided chest pain gradually. Patient described the chest pain as a heaviness sensation, intermittent, aggravated with exertion and relief with rest, with radiation of the chest pain to the left shoulder, with a severity of 5/10. Patient also endorsed of SOB and SUBRAMANIAN that has been chronic for her. Patient stated that she has chronic Hx of bilateral LE swelling and endorsed of 3 pillow orthopnea. Patient denied any prior cardiac workup with NST or TTE. Patient denied any fevers, chills, N/V/D/C, abdominal pain, dysuria, melena, hematochezia, recent travel, sick contact, pleuritic or positional chest pain.     On ED admission EKG revealed NSR at a rate of 92 with possible LAE, LBBB with QTc of 504, CE x 1: Trop: 18, Na: 133, K: 5.6->severely hemolyzed, Gluc: 169, AST: 150, ALT: 110. Prelim CXR: There is blunting of the perihilar vessels concerning for mild pulmonary edema. Small left pleural effusion. In the ED patient was given Aspirin 325mg. When examined patient is resting in the stretcher and denied any current chest pain or SOB.

## 2020-04-19 NOTE — CONSULT NOTE ADULT - PROBLEM SELECTOR RECOMMENDATION 9
Trend cardiac enzymes  TTE in AM  Monitor on telemetry  Load with ASA, plavix and heparin if uptrend in cardiac enzymes or complains of chest pain  Would need a nuclear stress test in the future

## 2020-04-19 NOTE — H&P ADULT - NEGATIVE GASTROINTESTINAL SYMPTOMS
no abdominal pain/no nausea/no vomiting/no constipation/no change in bowel habits/no hematochezia/no diarrhea/no melena

## 2020-04-19 NOTE — PROGRESS NOTE ADULT - PROBLEM SELECTOR PLAN 7
Transition of Care Status:  1. Name of PCP: Dr. Jimenez  2. PCP Contacted on Admission: ( ) Y    (x) N  3. PCP Contacted at Discharge: ( ) Y    ( ) N    ( ) N/A  4. Post-Discharge Appointment Date and Location:  5. Summary of Handoff given to PCP:

## 2020-04-19 NOTE — DISCHARGE NOTE PROVIDER - HOSPITAL COURSE
HPI:    71 y/o F with PMH of DM type II, HTN presents to ED with the compliant of left sided chest pain, palpitations, SUBRAMANIAN since Wednesday. As per the patient she was in her usual state of health and developed left sided chest pain gradually. Patient described the chest pain as a heaviness sensation, intermittent, aggravated with exertion and relief with rest, with radiation of the chest pain to the left shoulder, with a severity of 5/10. Patient also endorsed of SOB and SUBRAMANIAN that has been chronic for her. Patient stated that she has chronic Hx of bilateral LE swelling and endorsed of 3 pillow orthopnea. Patient denied any prior cardiac workup with NST or TTE. Patient denied any fevers, chills, N/V/D/C, abdominal pain, dysuria, melena, hematochezia, recent travel, sick contact, pleuritic or positional chest pain.         On ED admission EKG revealed NSR at a rate of 92 with possible LAE, LBBB with QTc of 504, CE x 1: Trop: 18, Na: 133, K: 5.6->severely hemolyzed, Gluc: 169, AST: 150, ALT: 110. Prelim CXR: There is blunting of the perihilar vessels concerning for mild pulmonary edema. Small left pleural effusion. In the ED patient was given Aspirin 325mg. When examined patient is resting in the stretcher and denied any current chest pain or SOB.         Hospital Course: HPI:    71 y/o F with PMH of DM type II, HTN presents to ED with the compliant of left sided chest pain, palpitations, SUBRAMANIAN since Wednesday. As per the patient she was in her usual state of health and developed left sided chest pain gradually. Patient described the chest pain as a heaviness sensation, intermittent, aggravated with exertion and relief with rest, with radiation of the chest pain to the left shoulder, with a severity of 5/10. Patient also endorsed of SOB and SUBRAMANIAN that has been chronic for her. Patient stated that she has chronic Hx of bilateral LE swelling and endorsed of 3 pillow orthopnea. Patient denied any prior cardiac workup with NST or TTE. Patient denied any fevers, chills, N/V/D/C, abdominal pain, dysuria, melena, hematochezia, recent travel, sick contact, pleuritic or positional chest pain.         On ED admission EKG revealed NSR at a rate of 92 with possible LAE, LBBB with QTc of 504, CE x 1: Trop: 18, Na: 133, K: 5.6->severely hemolyzed, Gluc: 169, AST: 150, ALT: 110. Prelim CXR: There is blunting of the perihilar vessels concerning for mild pulmonary edema. Small left pleural effusion. In the ED patient was given Aspirin 325mg. When examined patient is resting in the stretcher and denied any current chest pain or SOB.         Hospital Course:    Admitted for presumed NSTEMI. Troponins continued to increase, loaded with ASA in the ED and Plavix on the floor. Held off on heparin gtt as per cards given that pt was not having any chest pain and troponins were mildly elevated. TTE showed EF 25%, severely dilated LV. HPI:    71 y/o F with PMH of DM type II, HTN presents to ED with the compliant of left sided chest pain, palpitations, SUBRAMANIAN since Wednesday. As per the patient she was in her usual state of health and developed left sided chest pain gradually. Patient described the chest pain as a heaviness sensation, intermittent, aggravated with exertion and relief with rest, with radiation of the chest pain to the left shoulder, with a severity of 5/10. Patient also endorsed of SOB and SUBRAMANIAN that has been chronic for her. Patient stated that she has chronic Hx of bilateral LE swelling and endorsed of 3 pillow orthopnea. Patient denied any prior cardiac workup with NST or TTE. Patient denied any fevers, chills, N/V/D/C, abdominal pain, dysuria, melena, hematochezia, recent travel, sick contact, pleuritic or positional chest pain.         On ED admission EKG revealed NSR at a rate of 92 with possible LAE, LBBB with QTc of 504, CE x 1: Trop: 18, Na: 133, K: 5.6->severely hemolyzed, Gluc: 169, AST: 150, ALT: 110. Prelim CXR: There is blunting of the perihilar vessels concerning for mild pulmonary edema. Small left pleural effusion. In the ED patient was given Aspirin 325mg. When examined patient is resting in the stretcher and denied any current chest pain or SOB.         Hospital Course:    Admitted for presumed NSTEMI. Troponins continued to increase, loaded with ASA in the ED and Plavix on the floor. Held off on heparin gtt as per cards given that pt was not having any chest pain and troponins were mildly elevated. TTE showed EF 25%, severely dilated LV. Cardiology recommending medical management with coreg, losartan, DAPT, atorvastatin. Patient made aware to follow up with cards within a week, [phone], within 1 week post discharge followup via telehealth.

## 2020-04-20 LAB
ALBUMIN SERPL ELPH-MCNC: 3.5 G/DL — SIGNIFICANT CHANGE UP (ref 3.3–5)
ALP SERPL-CCNC: 101 U/L — SIGNIFICANT CHANGE UP (ref 40–120)
ALT FLD-CCNC: 86 U/L — HIGH (ref 4–33)
ANION GAP SERPL CALC-SCNC: 11 MMO/L — SIGNIFICANT CHANGE UP (ref 7–14)
AST SERPL-CCNC: 59 U/L — HIGH (ref 4–32)
BILIRUB SERPL-MCNC: 0.4 MG/DL — SIGNIFICANT CHANGE UP (ref 0.2–1.2)
BUN SERPL-MCNC: 24 MG/DL — HIGH (ref 7–23)
CALCIUM SERPL-MCNC: 7.8 MG/DL — LOW (ref 8.4–10.5)
CHLORIDE SERPL-SCNC: 108 MMOL/L — HIGH (ref 98–107)
CK MB BLD-MCNC: 3.4 NG/ML — SIGNIFICANT CHANGE UP (ref 1–4.7)
CK MB BLD-MCNC: SIGNIFICANT CHANGE UP (ref 0–2.5)
CK SERPL-CCNC: 67 U/L — SIGNIFICANT CHANGE UP (ref 25–170)
CO2 SERPL-SCNC: 22 MMOL/L — SIGNIFICANT CHANGE UP (ref 22–31)
CREAT SERPL-MCNC: 0.79 MG/DL — SIGNIFICANT CHANGE UP (ref 0.5–1.3)
GLUCOSE BLDC GLUCOMTR-MCNC: 110 MG/DL — HIGH (ref 70–99)
GLUCOSE BLDC GLUCOMTR-MCNC: 110 MG/DL — HIGH (ref 70–99)
GLUCOSE BLDC GLUCOMTR-MCNC: 118 MG/DL — HIGH (ref 70–99)
GLUCOSE BLDC GLUCOMTR-MCNC: 118 MG/DL — HIGH (ref 70–99)
GLUCOSE BLDC GLUCOMTR-MCNC: 128 MG/DL — HIGH (ref 70–99)
GLUCOSE BLDC GLUCOMTR-MCNC: 159 MG/DL — HIGH (ref 70–99)
GLUCOSE SERPL-MCNC: 145 MG/DL — HIGH (ref 70–99)
HCT VFR BLD CALC: 38.7 % — SIGNIFICANT CHANGE UP (ref 34.5–45)
HCV AB S/CO SERPL IA: 0.2 S/CO — SIGNIFICANT CHANGE UP (ref 0–0.99)
HCV AB SERPL-IMP: SIGNIFICANT CHANGE UP
HGB BLD-MCNC: 12.3 G/DL — SIGNIFICANT CHANGE UP (ref 11.5–15.5)
MAGNESIUM SERPL-MCNC: 2.4 MG/DL — SIGNIFICANT CHANGE UP (ref 1.6–2.6)
MCHC RBC-ENTMCNC: 27.3 PG — SIGNIFICANT CHANGE UP (ref 27–34)
MCHC RBC-ENTMCNC: 31.8 % — LOW (ref 32–36)
MCV RBC AUTO: 85.8 FL — SIGNIFICANT CHANGE UP (ref 80–100)
NRBC # FLD: 0 K/UL — SIGNIFICANT CHANGE UP (ref 0–0)
PHOSPHATE SERPL-MCNC: 4.6 MG/DL — HIGH (ref 2.5–4.5)
PLATELET # BLD AUTO: 208 K/UL — SIGNIFICANT CHANGE UP (ref 150–400)
PMV BLD: 11.8 FL — SIGNIFICANT CHANGE UP (ref 7–13)
POTASSIUM SERPL-MCNC: 3.9 MMOL/L — SIGNIFICANT CHANGE UP (ref 3.5–5.3)
POTASSIUM SERPL-SCNC: 3.9 MMOL/L — SIGNIFICANT CHANGE UP (ref 3.5–5.3)
PROT SERPL-MCNC: 6.6 G/DL — SIGNIFICANT CHANGE UP (ref 6–8.3)
RBC # BLD: 4.51 M/UL — SIGNIFICANT CHANGE UP (ref 3.8–5.2)
RBC # FLD: 15.5 % — HIGH (ref 10.3–14.5)
SODIUM SERPL-SCNC: 141 MMOL/L — SIGNIFICANT CHANGE UP (ref 135–145)
TROPONIN T, HIGH SENSITIVITY: 44 NG/L — SIGNIFICANT CHANGE UP (ref ?–14)
WBC # BLD: 6.08 K/UL — SIGNIFICANT CHANGE UP (ref 3.8–10.5)
WBC # FLD AUTO: 6.08 K/UL — SIGNIFICANT CHANGE UP (ref 3.8–10.5)

## 2020-04-20 PROCEDURE — 99233 SBSQ HOSP IP/OBS HIGH 50: CPT

## 2020-04-20 PROCEDURE — 93010 ELECTROCARDIOGRAM REPORT: CPT

## 2020-04-20 RX ORDER — ATORVASTATIN CALCIUM 80 MG/1
40 TABLET, FILM COATED ORAL AT BEDTIME
Refills: 0 | Status: DISCONTINUED | OUTPATIENT
Start: 2020-04-20 | End: 2020-04-21

## 2020-04-20 RX ORDER — CARVEDILOL PHOSPHATE 80 MG/1
3.12 CAPSULE, EXTENDED RELEASE ORAL EVERY 12 HOURS
Refills: 0 | Status: DISCONTINUED | OUTPATIENT
Start: 2020-04-20 | End: 2020-04-20

## 2020-04-20 RX ORDER — CARVEDILOL PHOSPHATE 80 MG/1
3.12 CAPSULE, EXTENDED RELEASE ORAL EVERY 12 HOURS
Refills: 0 | Status: DISCONTINUED | OUTPATIENT
Start: 2020-04-20 | End: 2020-04-21

## 2020-04-20 RX ADMIN — CLOPIDOGREL BISULFATE 75 MILLIGRAM(S): 75 TABLET, FILM COATED ORAL at 13:03

## 2020-04-20 RX ADMIN — CARVEDILOL PHOSPHATE 3.12 MILLIGRAM(S): 80 CAPSULE, EXTENDED RELEASE ORAL at 18:28

## 2020-04-20 RX ADMIN — LOSARTAN POTASSIUM 50 MILLIGRAM(S): 100 TABLET, FILM COATED ORAL at 06:50

## 2020-04-20 RX ADMIN — ATORVASTATIN CALCIUM 40 MILLIGRAM(S): 80 TABLET, FILM COATED ORAL at 23:03

## 2020-04-20 RX ADMIN — HEPARIN SODIUM 5000 UNIT(S): 5000 INJECTION INTRAVENOUS; SUBCUTANEOUS at 06:50

## 2020-04-20 RX ADMIN — Medication 81 MILLIGRAM(S): at 13:03

## 2020-04-20 RX ADMIN — HEPARIN SODIUM 5000 UNIT(S): 5000 INJECTION INTRAVENOUS; SUBCUTANEOUS at 18:28

## 2020-04-20 NOTE — PROGRESS NOTE ADULT - ASSESSMENT
70yoF w/ PMHx DM, HTN presents with left sided chest pain with radiation to left shoulder that occurred yesterday lasting for 1 hour not associated with any other symptoms.  Denies any cardiac history and during examination, patient disinterested in providing more patient history.  Currently denies any chest pain and states her symptoms have resolved.  First hsT 18 >> 52.  EKG LBBB (no old EKG to compare it to), does not meet sgarbossa criteria. 70yoF w/ PMHx DM, HTN presents with left sided chest pain with radiation to left shoulder that occurred yesterday lasting for 1 hour not associated with any other symptoms.  Denies any cardiac history and during examination, patient disinterested in providing more patient history.  Currently denies any chest pain and states her symptoms have resolved.  First hsT 18 >> 52.  EKG LBBB (no old EKG to compare it to), does not meet sgarbossa criteria.      As per patient's , patient's last workup was about 2 years ago and he thinks it was unremarkable  At this time, will manage medically  Add Coreg 3.125 BID and titrate up dose as tolerated to target dose of 25 BID  Continue Losartan for cardiomyopathy and afterload reduction in setting of severe MR  On DAPT for now, start atorvastatin 40  Will need ischemic assessment -- will have to arrange for outpatient follow-up  Volume status appears stable, maintain strict I/Os, daily standing wts, salt restriction, Lasix prn

## 2020-04-20 NOTE — PROGRESS NOTE ADULT - SUBJECTIVE AND OBJECTIVE BOX
Cardiology Attending Progress Note    Echocardiogram reviewed --> severe global LV dysfunction with dilated CM, severe MR    Vital Signs Last 24 Hrs  T(C): 36.3 (20 Apr 2020 05:59), Max: 37 (19 Apr 2020 10:01)  T(F): 97.3 (20 Apr 2020 05:59), Max: 98.6 (19 Apr 2020 10:01)  HR: 72 (20 Apr 2020 05:59) (64 - 95)  BP: 153/103 (20 Apr 2020 05:59) (128/79 - 153/103)  BP(mean): --  RR: 20 (20 Apr 2020 05:59) (17 - 20)  SpO2: 95% (20 Apr 2020 05:59) (94% - 98%)    Appearance: NAD  HEENT:   Normal oral mucosa, PERRL, EOMI	  Cardiovascular: Normal S1 S2, No JVD, No murmurs, No edema  Respiratory: Lungs clear to auscultation	  Psychiatry: Awake, alert    MEDICATIONS  (STANDING):  aspirin enteric coated 81 milliGRAM(s) Oral daily  clopidogrel Tablet 75 milliGRAM(s) Oral daily  dextrose 5%. 1000 milliLiter(s) (50 mL/Hr) IV Continuous <Continuous>  dextrose 50% Injectable 12.5 Gram(s) IV Push once  dextrose 50% Injectable 25 Gram(s) IV Push once  dextrose 50% Injectable 25 Gram(s) IV Push once  heparin   Injectable 5000 Unit(s) SubCutaneous every 12 hours  insulin lispro (HumaLOG) corrective regimen sliding scale   SubCutaneous three times a day before meals  insulin lispro (HumaLOG) corrective regimen sliding scale   SubCutaneous at bedtime  losartan 50 milliGRAM(s) Oral daily    MEDICATIONS  (PRN):  dextrose 40% Gel 15 Gram(s) Oral once PRN Blood Glucose LESS THAN 70 milliGRAM(s)/deciliter  glucagon  Injectable 1 milliGRAM(s) IntraMuscular once PRN Glucose LESS THAN 70 milligrams/deciliter                          12.3   6.08  )-----------( 208      ( 20 Apr 2020 06:34 )             38.7     04-20    141  |  108<H>  |  24<H>  ----------------------------<  145<H>  3.9   |  22  |  0.79    Ca    7.8<L>      20 Apr 2020 06:34  Phos  4.6     04-20  Mg     2.4     04-20    TPro  6.6  /  Alb  3.5  /  TBili  0.4  /  DBili  x   /  AST  59<H>  /  ALT  86<H>  /  AlkPhos  101  04-20    PT/INR - ( 18 Apr 2020 23:24 )   PT: 11.5 SEC;   INR: 1.01     PTT - ( 18 Apr 2020 23:24 )  PTT:22.6 SEC    < from: Xray Chest 2 Views PA/Lat (04.18.20 @ 23:59) >    EXAM:  XR CHEST PA LAT 2V        PROCEDURE DATE:  Apr 18 2020         INTERPRETATION:  EXAMINATION: XR CHEST PA AND LATERAL    CLINICAL INDICATION: chest pain    TECHNIQUE: Single portable view of the chest was obtained.    COMPARISON: None.    FINDINGS:     The cardiomediastinal silhouette is not well evaluated in this projection however it appears enlarged.    There is blunting of the perihilar vessels concerning for mild pulmonary edema.    Small left pleural effusion.    No pneumothorax.    Degenerative changes of the spine.    IMPRESSION:   There is blunting of the perihilar vessels concerning for mild pulmonary edema.    Small left pleural effusion.      ASHLEY LEMON M.D., RADIOLOGY RESIDENT  This document has been electronically signed.  DINO GREER M.D., ATTENDING RADIOLOGIST  This document has been electronically signed. Apr 19 2020  8:57AM          < from: Transthoracic Echocardiogram (04.19.20 @ 08:36) >    Patient name: LALY MAKI  YOB: 1950   Age: 70 (F)   MR#: [id number]  Study Date: 4/19/2020  Location: O/PSonographer: Soledad Howe SHER  Study quality: Technically good  Referring Physician: Not Available Doctor, MD  Blood Pressure: 122/75 mmHg  Height: 165 cm  Weight: 55 kg  BSA: 1.6 m2  Heart Rhythm: Normal sinus  Heart Rate: 64 mmHg  ------------------------------------------------------------------------  PROCEDURE: Transthoracic echocardiogram with 2-D, M-Mode  and complete spectral and color flow Doppler.  INDICATION: Chest pain, unspecified (R07.9)  ------------------------------------------------------------------------  DIMENSIONS:  Dimensions:     Normal Values:  LA:     4.4 cm    2.0 - 4.0 cm  Ao:     3.8 cm    2.0 - 3.8 cm  SEPTUM: 0.9 cm    0.6 - 1.2 cm  PWT:    0.8 cm    0.6 - 1.1 cm  LVIDd:  6.6 cm    3.0 - 5.6 cm  LVIDs:  6.2 cm    1.8 - 4.0 cm  Derived Variables:  LVMI: 147 g/m2  RWT: 0.24  Ejection Fraction (Visual Estimate): 25 %  Peak Velocity (m/sec): TV=3.0  ------------------------------------------------------------------------  OBSERVATIONS:  Mitral Valve: Normal appearing mitral valve leaflets.  Severe functional mitral regurgitation.  Aortic Root: Normal size aortic root.  Aortic Valve: Normal aortic valve. Mild aortic  regurgitation.  Left Atrium: Severely dilated left atrium.  Left Ventricle: Severe left ventricular enlargement.  Severely dilated left ventricle. Diffuse hypokinesis.  Severe reversible diastolic dysfunction (Stage III).  Right Heart: Normal right atrium. Normal right ventricular  size and function. Normal tricuspid valve.  Mild tricuspid  regurgitation. Normal pulmonic valve. Mild pulmonic  regurgitation.  Pericardium/PleuraNormal pericardium with trace pericardial  effusion.  Hemodynamic: Estimated right atrial pressure is mildly  elevated.  Mild pulmonary hypertension. Estimated PASP 44 mmHg.  No PFO seen with color Doppler.  ------------------------------------------------------------------------  CONCLUSIONS:  Severely dilated left ventricle. Diffuse hypokinesis.  Severe functional mitral regurgitation.  Mild pulmonary hypertension.  ------------------------------------------------------------------------  Confirmed on  4/19/2020 - 10:32:55 by Dontae Worley M.D.  ------------------------------------------------------------------------    < end of copied text > Cardiology Attending Progress Note    Echocardiogram reviewed --> severe global LV dysfunction with dilated CM, severe MR  Patient reports stable symptoms, wearing O2 NC for comfort  No other cardiac complaints  Spoke to  at bedside via patient's phone    Vital Signs Last 24 Hrs  T(C): 36.3 (20 Apr 2020 05:59), Max: 37 (19 Apr 2020 10:01)  T(F): 97.3 (20 Apr 2020 05:59), Max: 98.6 (19 Apr 2020 10:01)  HR: 72 (20 Apr 2020 05:59) (64 - 95)  BP: 153/103 (20 Apr 2020 05:59) (128/79 - 153/103)  BP(mean): --  RR: 20 (20 Apr 2020 05:59) (17 - 20)  SpO2: 95% (20 Apr 2020 05:59) (94% - 98%)    Appearance: NAD, laying flat in bed, +NC  HEENT:   Normal oral mucosa, PERRL, EOMI	  Cardiovascular: Normal S1 S2, 3/6 SM  Respiratory: Decreased breath sounds bilaterally	  Psychiatry: Awake, alert  Ext: No edema b/l LEs    MEDICATIONS  (STANDING):  aspirin enteric coated 81 milliGRAM(s) Oral daily  clopidogrel Tablet 75 milliGRAM(s) Oral daily  dextrose 5%. 1000 milliLiter(s) (50 mL/Hr) IV Continuous <Continuous>  dextrose 50% Injectable 12.5 Gram(s) IV Push once  dextrose 50% Injectable 25 Gram(s) IV Push once  dextrose 50% Injectable 25 Gram(s) IV Push once  heparin   Injectable 5000 Unit(s) SubCutaneous every 12 hours  insulin lispro (HumaLOG) corrective regimen sliding scale   SubCutaneous three times a day before meals  insulin lispro (HumaLOG) corrective regimen sliding scale   SubCutaneous at bedtime  losartan 50 milliGRAM(s) Oral daily    MEDICATIONS  (PRN):  dextrose 40% Gel 15 Gram(s) Oral once PRN Blood Glucose LESS THAN 70 milliGRAM(s)/deciliter  glucagon  Injectable 1 milliGRAM(s) IntraMuscular once PRN Glucose LESS THAN 70 milligrams/deciliter                          12.3   6.08  )-----------( 208      ( 20 Apr 2020 06:34 )             38.7     04-20    141  |  108<H>  |  24<H>  ----------------------------<  145<H>  3.9   |  22  |  0.79    Ca    7.8<L>      20 Apr 2020 06:34  Phos  4.6     04-20  Mg     2.4     04-20    TPro  6.6  /  Alb  3.5  /  TBili  0.4  /  DBili  x   /  AST  59<H>  /  ALT  86<H>  /  AlkPhos  101  04-20    PT/INR - ( 18 Apr 2020 23:24 )   PT: 11.5 SEC;   INR: 1.01     PTT - ( 18 Apr 2020 23:24 )  PTT:22.6 SEC    < from: Xray Chest 2 Views PA/Lat (04.18.20 @ 23:59) >    EXAM:  XR CHEST PA LAT 2V        PROCEDURE DATE:  Apr 18 2020         INTERPRETATION:  EXAMINATION: XR CHEST PA AND LATERAL    CLINICAL INDICATION: chest pain    TECHNIQUE: Single portable view of the chest was obtained.    COMPARISON: None.    FINDINGS:     The cardiomediastinal silhouette is not well evaluated in this projection however it appears enlarged.    There is blunting of the perihilar vessels concerning for mild pulmonary edema.    Small left pleural effusion.    No pneumothorax.    Degenerative changes of the spine.    IMPRESSION:   There is blunting of the perihilar vessels concerning for mild pulmonary edema.    Small left pleural effusion.      ASHLEY LEMON M.D., RADIOLOGY RESIDENT  This document has been electronically signed.  DINO GREER M.D., ATTENDING RADIOLOGIST  This document has been electronically signed. Apr 19 2020  8:57AM          < from: Transthoracic Echocardiogram (04.19.20 @ 08:36) >    Patient name: LALY MAKI  YOB: 1950   Age: 70 (F)   MR#: [id number]  Study Date: 4/19/2020  Location: O/PSonographer: Soledad Howe RDCS  Study quality: Technically good  Referring Physician: Not Available Doctor, MD  Blood Pressure: 122/75 mmHg  Height: 165 cm  Weight: 55 kg  BSA: 1.6 m2  Heart Rhythm: Normal sinus  Heart Rate: 64 mmHg  ------------------------------------------------------------------------  PROCEDURE: Transthoracic echocardiogram with 2-D, M-Mode  and complete spectral and color flow Doppler.  INDICATION: Chest pain, unspecified (R07.9)  ------------------------------------------------------------------------  DIMENSIONS:  Dimensions:     Normal Values:  LA:     4.4 cm    2.0 - 4.0 cm  Ao:     3.8 cm    2.0 - 3.8 cm  SEPTUM: 0.9 cm    0.6 - 1.2 cm  PWT:    0.8 cm    0.6 - 1.1 cm  LVIDd:  6.6 cm    3.0 - 5.6 cm  LVIDs:  6.2 cm    1.8 - 4.0 cm  Derived Variables:  LVMI: 147 g/m2  RWT: 0.24  Ejection Fraction (Visual Estimate): 25 %  Peak Velocity (m/sec): TV=3.0  ------------------------------------------------------------------------  OBSERVATIONS:  Mitral Valve: Normal appearing mitral valve leaflets.  Severe functional mitral regurgitation.  Aortic Root: Normal size aortic root.  Aortic Valve: Normal aortic valve. Mild aortic  regurgitation.  Left Atrium: Severely dilated left atrium.  Left Ventricle: Severe left ventricular enlargement.  Severely dilated left ventricle. Diffuse hypokinesis.  Severe reversible diastolic dysfunction (Stage III).  Right Heart: Normal right atrium. Normal right ventricular  size and function. Normal tricuspid valve.  Mild tricuspid  regurgitation. Normal pulmonic valve. Mild pulmonic  regurgitation.  Pericardium/PleuraNormal pericardium with trace pericardial  effusion.  Hemodynamic: Estimated right atrial pressure is mildly  elevated.  Mild pulmonary hypertension. Estimated PASP 44 mmHg.  No PFO seen with color Doppler.  ------------------------------------------------------------------------  CONCLUSIONS:  Severely dilated left ventricle. Diffuse hypokinesis.  Severe functional mitral regurgitation.  Mild pulmonary hypertension.  ------------------------------------------------------------------------  Confirmed on  4/19/2020 - 10:32:55 by Dontae Worley M.D.  ------------------------------------------------------------------------    < end of copied text >

## 2020-04-20 NOTE — PROGRESS NOTE ADULT - ATTENDING COMMENTS
pt with cardiac risk factors who reports an episode of palpitations leading to dizziness, fall, and chest pain.    hs troponin elevated, however CK wnl and CKMB minimally elevated with non significant relative index. remains chest pain free since initial episode. cardiac workup done one year ago reportedly wnl. exam significant for mild systolic murmur, mild-mod JVP elevation. pt does report hx of one week of increased ET.    suspect this may have been a tachyarrhythmia with a component of mild diastolic dysfunction. BNP elevated.    -check TTE  -trend cardiac enzymes  -continue asa/clopidogrel for now  -start atorvastatin 40mg daily  -IV furosemide 20mg x 1  -cont losartan. add metoprolol tartrate 12.5mg BID, hold for SBP<100 or HR<60.  -if TTE without significant evidence of ischemic heart disease, enzymes plateau, and pt remains without anginal symptoms, pt will need an outpt nuclear stress test and evaluation for cardiac event monitor.
above reviewed and agreed, discussed her care with cardiology. Appreciate cardiology recommendations.

## 2020-04-20 NOTE — PROGRESS NOTE ADULT - SUBJECTIVE AND OBJECTIVE BOX
INTERVAL HPI/OVERNIGHT EVENTS:    SUBJECTIVE: Patient seen and examined at bedside.     CONSTITUTIONAL: No weakness, fevers or chills  EYES/ENT: No visual changes;  No vertigo or throat pain   NECK: No pain or stiffness  RESPIRATORY: No cough, wheezing, hemoptysis; No shortness of breath  CARDIOVASCULAR: No chest pain or palpitations  GASTROINTESTINAL: No abdominal or epigastric pain. No nausea, vomiting, or hematemesis; No diarrhea or constipation. No melena or hematochezia.  GENITOURINARY: No dysuria, frequency or hematuria  NEUROLOGICAL: No numbness or weakness  SKIN: No itching, rashes    OBJECTIVE:    VITAL SIGNS:  ICU Vital Signs Last 24 Hrs  T(C): 36.3 (20 Apr 2020 05:59), Max: 37 (19 Apr 2020 10:01)  T(F): 97.3 (20 Apr 2020 05:59), Max: 98.6 (19 Apr 2020 10:01)  HR: 72 (20 Apr 2020 05:59) (64 - 95)  BP: 153/103 (20 Apr 2020 05:59) (128/79 - 153/103)  BP(mean): --  ABP: --  ABP(mean): --  RR: 20 (20 Apr 2020 05:59) (17 - 20)  SpO2: 95% (20 Apr 2020 05:59) (94% - 98%)        CAPILLARY BLOOD GLUCOSE      POCT Blood Glucose.: 128 mg/dL (20 Apr 2020 07:21)      PHYSICAL EXAM:    General: NAD  HEENT: NC/AT; PERRL, clear conjunctiva  Neck: supple  Respiratory: CTA b/l  Cardiovascular: +S1/S2; RRR  Abdomen: soft, NT/ND; +BS x4  Extremities: WWP, 2+ peripheral pulses b/l; no LE edema  Skin: normal color and turgor; no rash  Neurological:    MEDICATIONS:  MEDICATIONS  (STANDING):  aspirin enteric coated 81 milliGRAM(s) Oral daily  clopidogrel Tablet 75 milliGRAM(s) Oral daily  dextrose 5%. 1000 milliLiter(s) (50 mL/Hr) IV Continuous <Continuous>  dextrose 50% Injectable 12.5 Gram(s) IV Push once  dextrose 50% Injectable 25 Gram(s) IV Push once  dextrose 50% Injectable 25 Gram(s) IV Push once  heparin   Injectable 5000 Unit(s) SubCutaneous every 12 hours  insulin lispro (HumaLOG) corrective regimen sliding scale   SubCutaneous three times a day before meals  insulin lispro (HumaLOG) corrective regimen sliding scale   SubCutaneous at bedtime  losartan 50 milliGRAM(s) Oral daily    MEDICATIONS  (PRN):  dextrose 40% Gel 15 Gram(s) Oral once PRN Blood Glucose LESS THAN 70 milliGRAM(s)/deciliter  glucagon  Injectable 1 milliGRAM(s) IntraMuscular once PRN Glucose LESS THAN 70 milligrams/deciliter      ALLERGIES:  Allergies    penicillin (Unknown)    Intolerances        LABS:                        12.3   6.08  )-----------( 208      ( 20 Apr 2020 06:34 )             38.7     04-20    141  |  108<H>  |  24<H>  ----------------------------<  145<H>  3.9   |  22  |  0.79    Ca    7.8<L>      20 Apr 2020 06:34  Phos  4.6     04-20  Mg     2.4     04-20    TPro  6.6  /  Alb  3.5  /  TBili  0.4  /  DBili  x   /  AST  59<H>  /  ALT  86<H>  /  AlkPhos  101  04-20    PT/INR - ( 18 Apr 2020 23:24 )   PT: 11.5 SEC;   INR: 1.01          PTT - ( 18 Apr 2020 23:24 )  PTT:22.6 SEC      RADIOLOGY & ADDITIONAL TESTS: Reviewed.

## 2020-04-20 NOTE — PROGRESS NOTE ADULT - PROBLEM SELECTOR PLAN 1
EKG showing LBBB (negative for Sgarbossa criteria). HsT initially 18 and pt currently chest pain free. Repeat HsT went from 18 to 54.   - ROMAIN score suggests hep gtt, but given atypical nature of chest pain, normal CKMB, and current lack of symptoms, hold off on hep gtt per cardiology  - s/p Aspirin 325mg in the ED, c/w ASA 81mg qd   - Monitor on tele, serial EKG and Justino prn for any episodes of chest pain.     - Per cardiology, trend troponin for now(hold off on heparin drip).  - f/u HbA1c, lipid profile, CBC, CMP   - TTE ordered   - Will need nuclear stress test in the future   - HsT uptrending 74 <-- 54 <-- 18 but no chest pain. If develops chest pain, will load w/ ASA, Plavix, and heparin.  - Cardiology following; appreciate recs

## 2020-04-20 NOTE — PROVIDER CONTACT NOTE (OTHER) - ASSESSMENT
----- Message from Alesha Garcia sent at 1/10/2020  4:33 PM CST -----  Pt is requesting a call back,Pt could not make it to the emergency room and Is requesting a order be put in for a Ct Scan if possible.Please call and advise          Thank you   Patient A&Ox4, HR 72, Bp 153/103, Complaining of dizziness and SOB.  Patient Fs 110.

## 2020-04-21 ENCOUNTER — TRANSCRIPTION ENCOUNTER (OUTPATIENT)
Age: 70
End: 2020-04-21

## 2020-04-21 VITALS
TEMPERATURE: 98 F | SYSTOLIC BLOOD PRESSURE: 142 MMHG | RESPIRATION RATE: 18 BRPM | OXYGEN SATURATION: 100 % | HEART RATE: 77 BPM | DIASTOLIC BLOOD PRESSURE: 94 MMHG

## 2020-04-21 LAB
ALBUMIN SERPL ELPH-MCNC: 3 G/DL — LOW (ref 3.3–5)
ALP SERPL-CCNC: 85 U/L — SIGNIFICANT CHANGE UP (ref 40–120)
ALT FLD-CCNC: 65 U/L — HIGH (ref 4–33)
ANION GAP SERPL CALC-SCNC: 11 MMO/L — SIGNIFICANT CHANGE UP (ref 7–14)
AST SERPL-CCNC: 44 U/L — HIGH (ref 4–32)
BILIRUB SERPL-MCNC: 0.3 MG/DL — SIGNIFICANT CHANGE UP (ref 0.2–1.2)
BUN SERPL-MCNC: 18 MG/DL — SIGNIFICANT CHANGE UP (ref 7–23)
CALCIUM SERPL-MCNC: 8.8 MG/DL — SIGNIFICANT CHANGE UP (ref 8.4–10.5)
CHLORIDE SERPL-SCNC: 107 MMOL/L — SIGNIFICANT CHANGE UP (ref 98–107)
CO2 SERPL-SCNC: 22 MMOL/L — SIGNIFICANT CHANGE UP (ref 22–31)
CREAT SERPL-MCNC: 0.63 MG/DL — SIGNIFICANT CHANGE UP (ref 0.5–1.3)
GLUCOSE BLDC GLUCOMTR-MCNC: 112 MG/DL — HIGH (ref 70–99)
GLUCOSE BLDC GLUCOMTR-MCNC: 118 MG/DL — HIGH (ref 70–99)
GLUCOSE SERPL-MCNC: 112 MG/DL — HIGH (ref 70–99)
HCT VFR BLD CALC: 35.9 % — SIGNIFICANT CHANGE UP (ref 34.5–45)
HGB BLD-MCNC: 11.3 G/DL — LOW (ref 11.5–15.5)
MCHC RBC-ENTMCNC: 27 PG — SIGNIFICANT CHANGE UP (ref 27–34)
MCHC RBC-ENTMCNC: 31.5 % — LOW (ref 32–36)
MCV RBC AUTO: 85.7 FL — SIGNIFICANT CHANGE UP (ref 80–100)
NRBC # FLD: 0 K/UL — SIGNIFICANT CHANGE UP (ref 0–0)
PLATELET # BLD AUTO: 196 K/UL — SIGNIFICANT CHANGE UP (ref 150–400)
PMV BLD: 11.2 FL — SIGNIFICANT CHANGE UP (ref 7–13)
POTASSIUM SERPL-MCNC: 3.5 MMOL/L — SIGNIFICANT CHANGE UP (ref 3.5–5.3)
POTASSIUM SERPL-SCNC: 3.5 MMOL/L — SIGNIFICANT CHANGE UP (ref 3.5–5.3)
PROT SERPL-MCNC: 6 G/DL — SIGNIFICANT CHANGE UP (ref 6–8.3)
RBC # BLD: 4.19 M/UL — SIGNIFICANT CHANGE UP (ref 3.8–5.2)
RBC # FLD: 15.3 % — HIGH (ref 10.3–14.5)
SODIUM SERPL-SCNC: 140 MMOL/L — SIGNIFICANT CHANGE UP (ref 135–145)
WBC # BLD: 4.95 K/UL — SIGNIFICANT CHANGE UP (ref 3.8–10.5)
WBC # FLD AUTO: 4.95 K/UL — SIGNIFICANT CHANGE UP (ref 3.8–10.5)

## 2020-04-21 PROCEDURE — 99232 SBSQ HOSP IP/OBS MODERATE 35: CPT

## 2020-04-21 PROCEDURE — 99233 SBSQ HOSP IP/OBS HIGH 50: CPT

## 2020-04-21 RX ORDER — CARVEDILOL PHOSPHATE 80 MG/1
1 CAPSULE, EXTENDED RELEASE ORAL
Qty: 60 | Refills: 0
Start: 2020-04-21 | End: 2020-05-20

## 2020-04-21 RX ORDER — LOSARTAN POTASSIUM 100 MG/1
1 TABLET, FILM COATED ORAL
Qty: 0 | Refills: 0 | DISCHARGE

## 2020-04-21 RX ORDER — ATORVASTATIN CALCIUM 80 MG/1
1 TABLET, FILM COATED ORAL
Qty: 30 | Refills: 0
Start: 2020-04-21 | End: 2020-05-20

## 2020-04-21 RX ORDER — CLOPIDOGREL BISULFATE 75 MG/1
1 TABLET, FILM COATED ORAL
Qty: 30 | Refills: 0
Start: 2020-04-21 | End: 2020-05-20

## 2020-04-21 RX ORDER — LOSARTAN POTASSIUM 100 MG/1
1 TABLET, FILM COATED ORAL
Qty: 1 | Refills: 0
Start: 2020-04-21

## 2020-04-21 RX ORDER — ATORVASTATIN CALCIUM 80 MG/1
1 TABLET, FILM COATED ORAL
Qty: 0 | Refills: 0 | DISCHARGE
Start: 2020-04-21

## 2020-04-21 RX ADMIN — HEPARIN SODIUM 5000 UNIT(S): 5000 INJECTION INTRAVENOUS; SUBCUTANEOUS at 05:50

## 2020-04-21 RX ADMIN — Medication 81 MILLIGRAM(S): at 11:13

## 2020-04-21 RX ADMIN — CLOPIDOGREL BISULFATE 75 MILLIGRAM(S): 75 TABLET, FILM COATED ORAL at 11:13

## 2020-04-21 RX ADMIN — CARVEDILOL PHOSPHATE 3.12 MILLIGRAM(S): 80 CAPSULE, EXTENDED RELEASE ORAL at 17:42

## 2020-04-21 RX ADMIN — LOSARTAN POTASSIUM 50 MILLIGRAM(S): 100 TABLET, FILM COATED ORAL at 05:50

## 2020-04-21 NOTE — PROGRESS NOTE ADULT - ASSESSMENT
70yoF w/ PMHx DM, HTN presents with left sided chest pain with radiation to left shoulder that occurred yesterday lasting for 1 hour not associated with any other symptoms.  Denies any cardiac history and during examination, patient disinterested in providing more patient history.  Currently denies any chest pain and states her symptoms have resolved.  First hsT 18 >> 52.  EKG LBBB (no old EKG to compare it to), does not meet sgarbossa criteria.      As per patient's , patient's last workup was about 2 years ago and he thinks it was unremarkable  At this time, will manage medically  Add Coreg 3.125 BID and titrate up dose as tolerated to target dose of 25 BID  Continue Losartan for cardiomyopathy and afterload reduction in setting of severe MR  On DAPT for now, start atorvastatin 40  Stable volume status  Anticipate she should be able to go home today  Will need ischemic assessment -- will have to arrange for outpatient follow-up -- call [phone] for 1-week post-discharge followup via TeleHealth

## 2020-04-21 NOTE — DISCHARGE NOTE NURSING/CASE MANAGEMENT/SOCIAL WORK - NSTRANSFERBELONGINGSRESP_GEN_A_NUR
Reason for Visit: 16 year WCE    Nithya England is a 16 year old female who presents for well adolescent exam. Patient presents with Mother.    Concerns raised today include:   1. Mole: Nithya has a mole on her upper left scapula. It hasn't changed at all. She wants to make sure this looks okay.    2. Menses: Nithya has had irregular menses since she has had menarche about 2.5 years ago. Her periods are random and she cannot predict when they will come. She has tried starting to track them on an darlin on her phone but still has a hard time predicting them. Her periods typically last 3-5 days. She denies heavy bleeding or cramps. She denies any sexual activity. She denies any excessive hair growth or significant acne.     Interval History:  Birth history, medical history, surgical history, and family history reviewed and updated. No chronic medical conditions. No recent illnesses or injuries. No prior surgeries. No known allergies. No daily medications. Family history reviewed and updated in EPIC.    Diet: well balanced. Working on losing weight (eating healthier and exercising). BMI at 96th percentile    Sleeping: sleeping well    Activity: regular. Does tennis. Had sports physical previously    Menarche: Yes-see above    Vision/Hearing: Had borderline eye exam at [employer] physical. Has seen eye doctor previously and had borderline exams    Dental: Brushing teeth. Sees dentist    Safety: wears seat belt    SOCIAL:  Lives with: Parents  Recent changes/stressors: None  School: Just finished 8th grade  Sports/Activities:  Tennis  Substance use: No alcohol, tobacco, marijuana or other drug use  Sexual activity: is not sexually active    Mood: No concerns. Negative PHQ-2. No suicidal or homicidal ideation  Body Image: No concerns. Trying to lose weight with exercise and healthy eating    Tobacco Use: Never             DEVELOPMENT/BEHAVIOR CONCERNS:  No developmental or behavioral concerns    PHYSICAL EXAM:  Blood pressure  110/68, pulse 82, temperature 99.5 °F (37.5 °C), temperature source Oral, height 5' 2.5\" (1.588 m), weight 71.9 kg, last menstrual period 05/30/2018.  GEN:  Well appearing female adolescent, nontoxic, no acute distress.  Alert and oriented.  SKIN: Warm, normal turgor.  No cyanosis.  No bruises or lesions.  Small light brown nevus that is slightly raised on left scapula  HEAD:  Normocephalic, atraumatic.   EYES:  Conjunctiva appear normal, non-injected, non-icteric.  EOMI, PERRLA.  NOSE:  Appears normal, no flaring.  EARS:  Normal pinnae, no preauricular skin tags or pit.  TMs are transparent with good landmarks.  THROAT:  Oropharynx with moist mucus membranes and no lesions.  NECK:  Supple, no lymphadenopathy or masses.  No thyromegaly.  HEART:  Regular rate and rhythm.  Quiet precordium.  Normal S1, S2.  No murmurs, rubs, gallops.  2+ peripheral pulses.  LUNGS:  Clear to auscultation bilaterally.  No wheezes, rales, rhonchi.  Normal work of breathing.  ABD:  Soft, nontender.  No organomegaly or masses.  : normal female  Michael stage IV  MSK:  Spine straight, no scoliosis.  EXT:  Warm, dry, without abnormalities.  NEURO/PSYCH:  Normal tone, bulk, strength.  2+ DTRs bilaterally.  Normal affect and eye contact, answers questions appropriately, normal speech.    ASSESSMENT:  16 year old female well adolescent.  Normal growth and development  Irregular menses   Congenital nevus  BMI at 96th percentile    PLAN:   1. All parental concerns and questions discussed.  2. Anticipatory guidance provided,including nutrition, sleep, safety and development.  3. Non-fasting cholesterol screening done previously and normal  4. Immunizations per orders.  Risks, benefits, and side effects discussed.  5. Discussed that the mole does not have any alarming symptoms. If it changes color, size or becomes raised, she should be seen  6. Discussed healthy diet. Try to limit junk food, soda and juice. Increase fruits and  Vegetables. Try to  get 60 minutes of exercise 5 days per week  7. Discussed that you can have irregular menses the first 2 years after menarche. She is about 2.5 years from menarche, so it may still be this. She has otherwise normal secondary sexual characteristics. She does have elevated BMI, so PCOS is a possibility, but I would like to wait on work-up at this time and give her another 6 months. If periods do not regulate in 6 months, we will do work-up  8. Recommend a full eye exam by optometry or opthalmology    Follow-up:  RTC in 6 months for follow-up of periods or sooner if concerns    Clair Moreira MD  7/11/18   yes

## 2020-04-21 NOTE — PROGRESS NOTE ADULT - SUBJECTIVE AND OBJECTIVE BOX
INTERVAL HPI/OVERNIGHT EVENTS:    SUBJECTIVE: Patient seen and examined at bedside.     CONSTITUTIONAL: No weakness, fevers or chills  EYES/ENT: No visual changes;  No vertigo or throat pain   NECK: No pain or stiffness  RESPIRATORY: No cough, wheezing, hemoptysis; No shortness of breath  CARDIOVASCULAR: No chest pain or palpitations  GASTROINTESTINAL: No abdominal or epigastric pain. No nausea, vomiting, or hematemesis; No diarrhea or constipation. No melena or hematochezia.  GENITOURINARY: No dysuria, frequency or hematuria  NEUROLOGICAL: No numbness or weakness  SKIN: No itching, rashes    OBJECTIVE:    VITAL SIGNS:  ICU Vital Signs Last 24 Hrs  T(C): 36.7 (21 Apr 2020 10:00), Max: 36.7 (21 Apr 2020 10:00)  T(F): 98.1 (21 Apr 2020 10:00), Max: 98.1 (21 Apr 2020 10:00)  HR: 71 (21 Apr 2020 10:00) (58 - 72)  BP: 142/82 (21 Apr 2020 10:00) (131/66 - 142/82)  BP(mean): --  ABP: --  ABP(mean): --  RR: 18 (21 Apr 2020 10:00) (18 - 19)  SpO2: 100% (21 Apr 2020 10:00) (100% - 100%)        CAPILLARY BLOOD GLUCOSE      POCT Blood Glucose.: 118 mg/dL (21 Apr 2020 12:55)      PHYSICAL EXAM:    General: NAD  HEENT: NC/AT; PERRL, clear conjunctiva  Neck: supple  Respiratory: CTA b/l  Cardiovascular: +S1/S2; RRR  Abdomen: soft, NT/ND; +BS x4  Extremities: WWP, 2+ peripheral pulses b/l; no LE edema  Skin: normal color and turgor; no rash  Neurological:    MEDICATIONS:  MEDICATIONS  (STANDING):  aspirin enteric coated 81 milliGRAM(s) Oral daily  atorvastatin 40 milliGRAM(s) Oral at bedtime  carvedilol 3.125 milliGRAM(s) Oral every 12 hours  clopidogrel Tablet 75 milliGRAM(s) Oral daily  dextrose 5%. 1000 milliLiter(s) (50 mL/Hr) IV Continuous <Continuous>  dextrose 50% Injectable 12.5 Gram(s) IV Push once  dextrose 50% Injectable 25 Gram(s) IV Push once  dextrose 50% Injectable 25 Gram(s) IV Push once  heparin   Injectable 5000 Unit(s) SubCutaneous every 12 hours  insulin lispro (HumaLOG) corrective regimen sliding scale   SubCutaneous three times a day before meals  insulin lispro (HumaLOG) corrective regimen sliding scale   SubCutaneous at bedtime  losartan 50 milliGRAM(s) Oral daily    MEDICATIONS  (PRN):  dextrose 40% Gel 15 Gram(s) Oral once PRN Blood Glucose LESS THAN 70 milliGRAM(s)/deciliter  glucagon  Injectable 1 milliGRAM(s) IntraMuscular once PRN Glucose LESS THAN 70 milligrams/deciliter      ALLERGIES:  Allergies    penicillin (Unknown)    Intolerances        LABS:                        11.3   4.95  )-----------( 196      ( 21 Apr 2020 05:40 )             35.9     04-21    140  |  107  |  18  ----------------------------<  112<H>  3.5   |  22  |  0.63    Ca    8.8      21 Apr 2020 05:40  Phos  4.6     04-20  Mg     2.4     04-20    TPro  6.0  /  Alb  3.0<L>  /  TBili  0.3  /  DBili  x   /  AST  44<H>  /  ALT  65<H>  /  AlkPhos  85  04-21          RADIOLOGY & ADDITIONAL TESTS: Reviewed.

## 2020-04-21 NOTE — PROGRESS NOTE ADULT - SUBJECTIVE AND OBJECTIVE BOX
Cardiology Attending Progress Note    Echocardiogram reviewed --> severe global LV dysfunction with dilated CM, severe MR  Patient reports stable symptoms, feels better this AM  Wearing O2 NC when seen this morning --> instructed to keep off to evaluate O2 sat on RA which appears to be now at 100%  No other cardiac complaints; appears euvolemic on exam    Anticipate able to discharge to home today  F/U with me via TeleHealth appt in 1 week post discharge ([phone])    Vital Signs Last 24 Hrs  T(C): 36.7 (21 Apr 2020 10:00), Max: 36.7 (21 Apr 2020 10:00)  T(F): 98.1 (21 Apr 2020 10:00), Max: 98.1 (21 Apr 2020 10:00)  HR: 71 (21 Apr 2020 10:00) (58 - 72)  BP: 142/82 (21 Apr 2020 10:00) (131/66 - 142/82)  BP(mean): --  RR: 18 (21 Apr 2020 10:00) (18 - 19)  SpO2: 100% (21 Apr 2020 10:00) (100% - 100%)    Appearance: NAD, laying flat in bed, +NC  HEENT:   Normal oral mucosa, PERRL, EOMI	  Cardiovascular: Normal S1 S2, 3/6 SM  Respiratory: Decreased breath sounds bilaterally	  Psychiatry: Awake, alert  Ext: No edema b/l LEs    MEDICATIONS  (STANDING):  aspirin enteric coated 81 milliGRAM(s) Oral daily  atorvastatin 40 milliGRAM(s) Oral at bedtime  carvedilol 3.125 milliGRAM(s) Oral every 12 hours  clopidogrel Tablet 75 milliGRAM(s) Oral daily  dextrose 5%. 1000 milliLiter(s) (50 mL/Hr) IV Continuous <Continuous>  dextrose 50% Injectable 12.5 Gram(s) IV Push once  dextrose 50% Injectable 25 Gram(s) IV Push once  dextrose 50% Injectable 25 Gram(s) IV Push once  heparin   Injectable 5000 Unit(s) SubCutaneous every 12 hours  insulin lispro (HumaLOG) corrective regimen sliding scale   SubCutaneous three times a day before meals  insulin lispro (HumaLOG) corrective regimen sliding scale   SubCutaneous at bedtime  losartan 50 milliGRAM(s) Oral daily    MEDICATIONS  (PRN):  dextrose 40% Gel 15 Gram(s) Oral once PRN Blood Glucose LESS THAN 70 milliGRAM(s)/deciliter  glucagon  Injectable 1 milliGRAM(s) IntraMuscular once PRN Glucose LESS THAN 70 milligrams/deciliter                                   11.3   4.95  )-----------( 196      ( 21 Apr 2020 05:40 )             35.9   04-21    140  |  107  |  18  ----------------------------<  112<H>  3.5   |  22  |  0.63    Ca    8.8      21 Apr 2020 05:40  Phos  4.6     04-20  Mg     2.4     04-20    TPro  6.0  /  Alb  3.0<L>  /  TBili  0.3  /  DBili  x   /  AST  44<H>  /  ALT  65<H>  /  AlkPhos  85  04-21          < from: Xray Chest 2 Views PA/Lat (04.18.20 @ 23:59) >    EXAM:  XR CHEST PA LAT 2V        PROCEDURE DATE:  Apr 18 2020         INTERPRETATION:  EXAMINATION: XR CHEST PA AND LATERAL    CLINICAL INDICATION: chest pain    TECHNIQUE: Single portable view of the chest was obtained.    COMPARISON: None.    FINDINGS:     The cardiomediastinal silhouette is not well evaluated in this projection however it appears enlarged.    There is blunting of the perihilar vessels concerning for mild pulmonary edema.    Small left pleural effusion.    No pneumothorax.    Degenerative changes of the spine.    IMPRESSION:   There is blunting of the perihilar vessels concerning for mild pulmonary edema.    Small left pleural effusion.      ASHLEY LEMON M.D., RADIOLOGY RESIDENT  This document has been electronically signed.  DINO GREER M.D., ATTENDING RADIOLOGIST  This document has been electronically signed. Apr 19 2020  8:57AM          < from: Transthoracic Echocardiogram (04.19.20 @ 08:36) >    Patient name: LALY MAKI  YOB: 1950   Age: 70 (F)   MR#: [id number]  Study Date: 4/19/2020  Location: O/PSonographer: Soledad Howe RDCS  Study quality: Technically good  Referring Physician: Not Available Doctor, MD  Blood Pressure: 122/75 mmHg  Height: 165 cm  Weight: 55 kg  BSA: 1.6 m2  Heart Rhythm: Normal sinus  Heart Rate: 64 mmHg  ------------------------------------------------------------------------  PROCEDURE: Transthoracic echocardiogram with 2-D, M-Mode  and complete spectral and color flow Doppler.  INDICATION: Chest pain, unspecified (R07.9)  ------------------------------------------------------------------------  DIMENSIONS:  Dimensions:     Normal Values:  LA:     4.4 cm    2.0 - 4.0 cm  Ao:     3.8 cm    2.0 - 3.8 cm  SEPTUM: 0.9 cm    0.6 - 1.2 cm  PWT:    0.8 cm    0.6 - 1.1 cm  LVIDd:  6.6 cm    3.0 - 5.6 cm  LVIDs:  6.2 cm    1.8 - 4.0 cm  Derived Variables:  LVMI: 147 g/m2  RWT: 0.24  Ejection Fraction (Visual Estimate): 25 %  Peak Velocity (m/sec): TV=3.0  ------------------------------------------------------------------------  OBSERVATIONS:  Mitral Valve: Normal appearing mitral valve leaflets.  Severe functional mitral regurgitation.  Aortic Root: Normal size aortic root.  Aortic Valve: Normal aortic valve. Mild aortic  regurgitation.  Left Atrium: Severely dilated left atrium.  Left Ventricle: Severe left ventricular enlargement.  Severely dilated left ventricle. Diffuse hypokinesis.  Severe reversible diastolic dysfunction (Stage III).  Right Heart: Normal right atrium. Normal right ventricular  size and function. Normal tricuspid valve.  Mild tricuspid  regurgitation. Normal pulmonic valve. Mild pulmonic  regurgitation.  Pericardium/PleuraNormal pericardium with trace pericardial  effusion.  Hemodynamic: Estimated right atrial pressure is mildly  elevated.  Mild pulmonary hypertension. Estimated PASP 44 mmHg.  No PFO seen with color Doppler.  ------------------------------------------------------------------------  CONCLUSIONS:  Severely dilated left ventricle. Diffuse hypokinesis.  Severe functional mitral regurgitation.  Mild pulmonary hypertension.  ------------------------------------------------------------------------  Confirmed on  4/19/2020 - 10:32:55 by Dontae Worley M.D.  ------------------------------------------------------------------------    < end of copied text >

## 2020-04-21 NOTE — DISCHARGE NOTE NURSING/CASE MANAGEMENT/SOCIAL WORK - PATIENT PORTAL LINK FT
You can access the FollowMyHealth Patient Portal offered by [facility] by registering at the following website: [email or web address]. By joining [facility]’s FollowMyHealth portal, you will also be able to view your health information using other applications (apps) compatible with our system.

## 2020-04-21 NOTE — PROGRESS NOTE ADULT - REASON FOR ADMISSION
Left sided chest pain, SOB and palpitations

## 2020-05-05 ENCOUNTER — APPOINTMENT (OUTPATIENT)
Dept: CARDIOLOGY | Facility: CLINIC | Age: 70
End: 2020-05-05
Payer: SELF-PAY

## 2020-05-05 VITALS — DIASTOLIC BLOOD PRESSURE: 70 MMHG | SYSTOLIC BLOOD PRESSURE: 120 MMHG

## 2020-05-05 PROBLEM — Z00.00 ENCOUNTER FOR PREVENTIVE HEALTH EXAMINATION: Status: ACTIVE | Noted: 2020-05-05

## 2020-05-05 PROCEDURE — 99205 OFFICE O/P NEW HI 60 MIN: CPT | Mod: 95

## 2020-05-05 RX ORDER — LOSARTAN POTASSIUM 25 MG/1
25 TABLET, FILM COATED ORAL
Qty: 30 | Refills: 0 | Status: DISCONTINUED | COMMUNITY
Start: 2020-04-22 | End: 2020-05-05

## 2020-05-07 PROBLEM — I10 ESSENTIAL (PRIMARY) HYPERTENSION: Chronic | Status: ACTIVE | Noted: 2020-04-18

## 2020-05-07 PROBLEM — E11.9 TYPE 2 DIABETES MELLITUS WITHOUT COMPLICATIONS: Chronic | Status: ACTIVE | Noted: 2020-04-18

## 2020-05-12 ENCOUNTER — APPOINTMENT (OUTPATIENT)
Dept: CARDIOLOGY | Facility: CLINIC | Age: 70
End: 2020-05-12
Payer: SELF-PAY

## 2020-05-12 ENCOUNTER — INPATIENT (INPATIENT)
Facility: HOSPITAL | Age: 70
LOS: 1 days | Discharge: HOME | End: 2020-05-14
Attending: HOSPITALIST | Admitting: HOSPITALIST
Payer: SELF-PAY

## 2020-05-12 VITALS
OXYGEN SATURATION: 100 % | SYSTOLIC BLOOD PRESSURE: 149 MMHG | HEART RATE: 86 BPM | TEMPERATURE: 98 F | DIASTOLIC BLOOD PRESSURE: 88 MMHG | RESPIRATION RATE: 22 BRPM

## 2020-05-12 VITALS — WEIGHT: 150.6 LBS

## 2020-05-12 VITALS — BODY MASS INDEX: 23.59 KG/M2 | HEIGHT: 67 IN

## 2020-05-12 VITALS — SYSTOLIC BLOOD PRESSURE: 116 MMHG | HEART RATE: 56 BPM | DIASTOLIC BLOOD PRESSURE: 80 MMHG

## 2020-05-12 DIAGNOSIS — Z02.9 ENCOUNTER FOR ADMINISTRATIVE EXAMINATIONS, UNSPECIFIED: ICD-10-CM

## 2020-05-12 DIAGNOSIS — I42.0 DILATED CARDIOMYOPATHY: ICD-10-CM

## 2020-05-12 DIAGNOSIS — E11.9 TYPE 2 DIABETES MELLITUS WITHOUT COMPLICATIONS: ICD-10-CM

## 2020-05-12 DIAGNOSIS — R74.0 NONSPECIFIC ELEVATION OF LEVELS OF TRANSAMINASE AND LACTIC ACID DEHYDROGENASE [LDH]: ICD-10-CM

## 2020-05-12 DIAGNOSIS — N39.41 URGE INCONTINENCE: ICD-10-CM

## 2020-05-12 DIAGNOSIS — I50.9 HEART FAILURE, UNSPECIFIED: ICD-10-CM

## 2020-05-12 DIAGNOSIS — Z29.9 ENCOUNTER FOR PROPHYLACTIC MEASURES, UNSPECIFIED: ICD-10-CM

## 2020-05-12 DIAGNOSIS — R06.02 SHORTNESS OF BREATH: ICD-10-CM

## 2020-05-12 DIAGNOSIS — I10 ESSENTIAL (PRIMARY) HYPERTENSION: ICD-10-CM

## 2020-05-12 DIAGNOSIS — R07.9 CHEST PAIN, UNSPECIFIED: ICD-10-CM

## 2020-05-12 LAB
ALBUMIN SERPL ELPH-MCNC: 3.5 G/DL — SIGNIFICANT CHANGE UP (ref 3.3–5)
ALP SERPL-CCNC: 101 U/L — SIGNIFICANT CHANGE UP (ref 40–120)
ALT FLD-CCNC: 72 U/L — HIGH (ref 4–33)
ANION GAP SERPL CALC-SCNC: 13 MMO/L — SIGNIFICANT CHANGE UP (ref 7–14)
APPEARANCE UR: CLEAR — SIGNIFICANT CHANGE UP
AST SERPL-CCNC: 81 U/L — HIGH (ref 4–32)
BASE EXCESS BLDV CALC-SCNC: -1.5 MMOL/L — SIGNIFICANT CHANGE UP
BASOPHILS # BLD AUTO: 0.04 K/UL — SIGNIFICANT CHANGE UP (ref 0–0.2)
BASOPHILS NFR BLD AUTO: 0.8 % — SIGNIFICANT CHANGE UP (ref 0–2)
BILIRUB SERPL-MCNC: 0.5 MG/DL — SIGNIFICANT CHANGE UP (ref 0.2–1.2)
BILIRUB UR-MCNC: NEGATIVE — SIGNIFICANT CHANGE UP
BLOOD GAS VENOUS - CREATININE: 0.75 MG/DL — SIGNIFICANT CHANGE UP (ref 0.5–1.3)
BLOOD UR QL VISUAL: NEGATIVE — SIGNIFICANT CHANGE UP
BUN SERPL-MCNC: 16 MG/DL — SIGNIFICANT CHANGE UP (ref 7–23)
CALCIUM SERPL-MCNC: 8.8 MG/DL — SIGNIFICANT CHANGE UP (ref 8.4–10.5)
CHLORIDE BLDV-SCNC: 109 MMOL/L — HIGH (ref 96–108)
CHLORIDE SERPL-SCNC: 107 MMOL/L — SIGNIFICANT CHANGE UP (ref 98–107)
CO2 SERPL-SCNC: 20 MMOL/L — LOW (ref 22–31)
COLOR SPEC: COLORLESS — SIGNIFICANT CHANGE UP
CREAT SERPL-MCNC: 0.72 MG/DL — SIGNIFICANT CHANGE UP (ref 0.5–1.3)
EOSINOPHIL # BLD AUTO: 0.05 K/UL — SIGNIFICANT CHANGE UP (ref 0–0.5)
EOSINOPHIL NFR BLD AUTO: 1 % — SIGNIFICANT CHANGE UP (ref 0–6)
GAS PNL BLDV: 140 MMOL/L — SIGNIFICANT CHANGE UP (ref 136–146)
GLUCOSE BLDV-MCNC: 174 MG/DL — HIGH (ref 70–99)
GLUCOSE SERPL-MCNC: 179 MG/DL — HIGH (ref 70–99)
GLUCOSE UR-MCNC: NEGATIVE — SIGNIFICANT CHANGE UP
HCO3 BLDV-SCNC: 23 MMOL/L — SIGNIFICANT CHANGE UP (ref 20–27)
HCT VFR BLD CALC: 38.2 % — SIGNIFICANT CHANGE UP (ref 34.5–45)
HCT VFR BLDV CALC: 38.2 % — SIGNIFICANT CHANGE UP (ref 34.5–45)
HGB BLD-MCNC: 12.3 G/DL — SIGNIFICANT CHANGE UP (ref 11.5–15.5)
HGB BLDV-MCNC: 12.4 G/DL — SIGNIFICANT CHANGE UP (ref 11.5–15.5)
IMM GRANULOCYTES NFR BLD AUTO: 0.2 % — SIGNIFICANT CHANGE UP (ref 0–1.5)
KETONES UR-MCNC: NEGATIVE — SIGNIFICANT CHANGE UP
LACTATE BLDV-MCNC: 1.5 MMOL/L — SIGNIFICANT CHANGE UP (ref 0.5–2)
LEUKOCYTE ESTERASE UR-ACNC: NEGATIVE — SIGNIFICANT CHANGE UP
LYMPHOCYTES # BLD AUTO: 0.85 K/UL — LOW (ref 1–3.3)
LYMPHOCYTES # BLD AUTO: 17 % — SIGNIFICANT CHANGE UP (ref 13–44)
MCHC RBC-ENTMCNC: 26.9 PG — LOW (ref 27–34)
MCHC RBC-ENTMCNC: 32.2 % — SIGNIFICANT CHANGE UP (ref 32–36)
MCV RBC AUTO: 83.6 FL — SIGNIFICANT CHANGE UP (ref 80–100)
MONOCYTES # BLD AUTO: 0.47 K/UL — SIGNIFICANT CHANGE UP (ref 0–0.9)
MONOCYTES NFR BLD AUTO: 9.4 % — SIGNIFICANT CHANGE UP (ref 2–14)
NEUTROPHILS # BLD AUTO: 3.59 K/UL — SIGNIFICANT CHANGE UP (ref 1.8–7.4)
NEUTROPHILS NFR BLD AUTO: 71.6 % — SIGNIFICANT CHANGE UP (ref 43–77)
NITRITE UR-MCNC: NEGATIVE — SIGNIFICANT CHANGE UP
NRBC # FLD: 0.03 K/UL — SIGNIFICANT CHANGE UP (ref 0–0)
NT-PROBNP SERPL-SCNC: 5935 PG/ML — SIGNIFICANT CHANGE UP
PCO2 BLDV: 37 MMHG — LOW (ref 41–51)
PH BLDV: 7.4 PH — SIGNIFICANT CHANGE UP (ref 7.32–7.43)
PH UR: 6.5 — SIGNIFICANT CHANGE UP (ref 5–8)
PLATELET # BLD AUTO: 251 K/UL — SIGNIFICANT CHANGE UP (ref 150–400)
PMV BLD: 11.4 FL — SIGNIFICANT CHANGE UP (ref 7–13)
PO2 BLDV: 41 MMHG — HIGH (ref 35–40)
POTASSIUM BLDV-SCNC: 3.7 MMOL/L — SIGNIFICANT CHANGE UP (ref 3.4–4.5)
POTASSIUM SERPL-MCNC: 4.4 MMOL/L — SIGNIFICANT CHANGE UP (ref 3.5–5.3)
POTASSIUM SERPL-SCNC: 4.4 MMOL/L — SIGNIFICANT CHANGE UP (ref 3.5–5.3)
PROT SERPL-MCNC: 6.9 G/DL — SIGNIFICANT CHANGE UP (ref 6–8.3)
PROT UR-MCNC: NEGATIVE — SIGNIFICANT CHANGE UP
RBC # BLD: 4.57 M/UL — SIGNIFICANT CHANGE UP (ref 3.8–5.2)
RBC # FLD: 15.1 % — HIGH (ref 10.3–14.5)
SAO2 % BLDV: 70.7 % — SIGNIFICANT CHANGE UP (ref 60–85)
SODIUM SERPL-SCNC: 140 MMOL/L — SIGNIFICANT CHANGE UP (ref 135–145)
SP GR SPEC: 1.01 — SIGNIFICANT CHANGE UP (ref 1–1.04)
TROPONIN T, HIGH SENSITIVITY: 10 NG/L — SIGNIFICANT CHANGE UP (ref ?–14)
TROPONIN T, HIGH SENSITIVITY: 6 NG/L — SIGNIFICANT CHANGE UP (ref ?–14)
UROBILINOGEN FLD QL: NORMAL — SIGNIFICANT CHANGE UP
WBC # BLD: 5.01 K/UL — SIGNIFICANT CHANGE UP (ref 3.8–10.5)
WBC # FLD AUTO: 5.01 K/UL — SIGNIFICANT CHANGE UP (ref 3.8–10.5)

## 2020-05-12 PROCEDURE — 99223 1ST HOSP IP/OBS HIGH 75: CPT | Mod: GC

## 2020-05-12 PROCEDURE — 99215 OFFICE O/P EST HI 40 MIN: CPT | Mod: 95

## 2020-05-12 PROCEDURE — 99223 1ST HOSP IP/OBS HIGH 75: CPT

## 2020-05-12 PROCEDURE — 71045 X-RAY EXAM CHEST 1 VIEW: CPT | Mod: 26

## 2020-05-12 RX ORDER — ATORVASTATIN CALCIUM 80 MG/1
40 TABLET, FILM COATED ORAL AT BEDTIME
Refills: 0 | Status: DISCONTINUED | OUTPATIENT
Start: 2020-05-12 | End: 2020-05-14

## 2020-05-12 RX ORDER — CLOPIDOGREL BISULFATE 75 MG/1
75 TABLET, FILM COATED ORAL DAILY
Refills: 0 | Status: DISCONTINUED | OUTPATIENT
Start: 2020-05-12 | End: 2020-05-14

## 2020-05-12 RX ORDER — DEXTROSE 50 % IN WATER 50 %
15 SYRINGE (ML) INTRAVENOUS ONCE
Refills: 0 | Status: DISCONTINUED | OUTPATIENT
Start: 2020-05-12 | End: 2020-05-14

## 2020-05-12 RX ORDER — INSULIN LISPRO 100/ML
VIAL (ML) SUBCUTANEOUS
Refills: 0 | Status: DISCONTINUED | OUTPATIENT
Start: 2020-05-12 | End: 2020-05-14

## 2020-05-12 RX ORDER — CARVEDILOL PHOSPHATE 80 MG/1
3.12 CAPSULE, EXTENDED RELEASE ORAL EVERY 12 HOURS
Refills: 0 | Status: DISCONTINUED | OUTPATIENT
Start: 2020-05-12 | End: 2020-05-13

## 2020-05-12 RX ORDER — DEXTROSE 50 % IN WATER 50 %
25 SYRINGE (ML) INTRAVENOUS ONCE
Refills: 0 | Status: DISCONTINUED | OUTPATIENT
Start: 2020-05-12 | End: 2020-05-14

## 2020-05-12 RX ORDER — FUROSEMIDE 40 MG
40 TABLET ORAL ONCE
Refills: 0 | Status: COMPLETED | OUTPATIENT
Start: 2020-05-12 | End: 2020-05-12

## 2020-05-12 RX ORDER — DEXTROSE 50 % IN WATER 50 %
12.5 SYRINGE (ML) INTRAVENOUS ONCE
Refills: 0 | Status: DISCONTINUED | OUTPATIENT
Start: 2020-05-12 | End: 2020-05-14

## 2020-05-12 RX ORDER — FUROSEMIDE 40 MG
20 TABLET ORAL
Refills: 0 | Status: DISCONTINUED | OUTPATIENT
Start: 2020-05-12 | End: 2020-05-13

## 2020-05-12 RX ORDER — GLUCAGON INJECTION, SOLUTION 0.5 MG/.1ML
1 INJECTION, SOLUTION SUBCUTANEOUS ONCE
Refills: 0 | Status: DISCONTINUED | OUTPATIENT
Start: 2020-05-12 | End: 2020-05-14

## 2020-05-12 RX ORDER — ENOXAPARIN SODIUM 100 MG/ML
40 INJECTION SUBCUTANEOUS DAILY
Refills: 0 | Status: DISCONTINUED | OUTPATIENT
Start: 2020-05-12 | End: 2020-05-14

## 2020-05-12 RX ORDER — LOSARTAN POTASSIUM 100 MG/1
50 TABLET, FILM COATED ORAL DAILY
Refills: 0 | Status: DISCONTINUED | OUTPATIENT
Start: 2020-05-12 | End: 2020-05-14

## 2020-05-12 RX ORDER — SODIUM CHLORIDE 9 MG/ML
1000 INJECTION, SOLUTION INTRAVENOUS
Refills: 0 | Status: DISCONTINUED | OUTPATIENT
Start: 2020-05-12 | End: 2020-05-14

## 2020-05-12 RX ORDER — ASPIRIN/CALCIUM CARB/MAGNESIUM 324 MG
81 TABLET ORAL DAILY
Refills: 0 | Status: DISCONTINUED | OUTPATIENT
Start: 2020-05-12 | End: 2020-05-14

## 2020-05-12 RX ADMIN — Medication 20 MILLIGRAM(S): at 21:33

## 2020-05-12 RX ADMIN — LOSARTAN POTASSIUM 50 MILLIGRAM(S): 100 TABLET, FILM COATED ORAL at 18:28

## 2020-05-12 RX ADMIN — ATORVASTATIN CALCIUM 40 MILLIGRAM(S): 80 TABLET, FILM COATED ORAL at 21:33

## 2020-05-12 RX ADMIN — Medication 40 MILLIGRAM(S): at 14:43

## 2020-05-12 NOTE — H&P ADULT - PROBLEM SELECTOR PLAN 1
-Associated with intermittent lightheadedness and SUBRAMANIAN  -Likely in the setting of worsening cardiomyopathy and heart failure. Low suspicion for ACS as patient with Trop of 10 and EKG without evidence of ischemic change (LBBB not new).   -There is a possibility of demand-supply mismatch that is exacerbated by exertion given last TTE in 4/20 with EF of 25%. Repeat TTE to assess for interim changes  -Lipids last admission wnl, A1C 6.3   -Patient may benefit from nuclear stress test vs? Cath   -c/w DAPT and atorvastatin   -cardiology consulted, recs pending -Associated with intermittent lightheadedness and SUBRAMANIAN  -Likely in the setting of worsening cardiomyopathy and heart failure. Low suspicion for ACS as patient with Trop of 10 and EKG without evidence of ischemic change (LBBB not new).   -There is a possibility of demand-supply mismatch that is exacerbated by exertion given last TTE in 4/20 with EF of 25%. Repeat TTE to assess for interim changes  -Lipids last admission wnl, A1C 6.3   -Patient may benefit from nuclear stress test vs? Cath   -c/w DAPT and atorvastatin   -cardiology consulted, recs pending  -CXR concerning for possible atypical PNA/viral, moderate suspicion for COVID given SOB and lymphopenia -Associated with intermittent lightheadedness and SUBRAMANIAN  -Likely in the setting of worsening cardiomyopathy and heart failure. Low suspicion for ACS as patient with Trop of 10 and EKG without evidence of ischemic change (LBBB not new).   -There is a possibility of demand-supply mismatch that is exacerbated by exertion given last TTE in 4/20 with EF of 25%. Repeat TTE to assess for interim changes  -Lipids last admission wnl, A1C 6.3   -Patient may benefit from  ?Cath   -c/w DAPT and atorvastatin   -cardiology consulted, recs pending  -CXR concerning for possible atypical PNA/viral, moderate suspicion for COVID given SOB and lymphopenia

## 2020-05-12 NOTE — H&P ADULT - NSHPPHYSICALEXAM_GEN_ALL_CORE
Vital Signs Last 24 Hrs  T(C): 36.6 (12 May 2020 15:23), Max: 36.6 (12 May 2020 14:43)  T(F): 97.9 (12 May 2020 15:23), Max: 97.9 (12 May 2020 15:23)  HR: 71 (12 May 2020 15:23) (71 - 86)  BP: 144/71 (12 May 2020 15:23) (144/71 - 173/106)  BP(mean): --  RR: 21 (12 May 2020 15:23) (21 - 26)  SpO2: 100% (12 May 2020 15:23) (100% - 100%)    PHYSICAL EXAM:  GENERAL: NAD, well-developed  HEAD: Atraumatic, Normocephalic  EYES: EOMI, PERRLA, conjunctiva and sclera clear  NECK: Supple, No JVD  CHEST/LUNG: Bibasilar crackles   HEART: Regular rate and rhythm; No murmurs, rubs, or gallops  ABDOMEN: Soft, Nontender, Nondistended; Bowel sounds present  EXTREMITIES:  2+ dP pulses b/l, No clubbing, cyanosis, or edema  PSYCH: reactive affect  NEUROLOGY: AAOx3, non-focal  SKIN: No rashes or lesions Vital Signs Last 24 Hrs  T(C): 36.6 (12 May 2020 15:23), Max: 36.6 (12 May 2020 14:43)  T(F): 97.9 (12 May 2020 15:23), Max: 97.9 (12 May 2020 15:23)  HR: 71 (12 May 2020 15:23) (71 - 86)  BP: 144/71 (12 May 2020 15:23) (144/71 - 173/106)  BP(mean): --  RR: 21 (12 May 2020 15:23) (21 - 26)  SpO2: 100% (12 May 2020 15:23) (100% - 100%)    PHYSICAL EXAM:  GENERAL: NAD, well-developed  HEAD: Atraumatic, Normocephalic  EYES: EOMI, PERRLA, conjunctiva and sclera clear  NECK: Supple, No JVD  CHEST/LUNG: Bibasilar crackles   HEART: Regular rate and rhythm; No murmurs, rubs, or gallops  ABDOMEN: Soft, Nontender, Nondistended; Bowel sounds present  EXTREMITIES:  2+ dP pulses b/l, No clubbing, cyanosis, 1+ pitting edema in BLE   PSYCH: reactive affect  NEUROLOGY: AAOx3, non-focal  SKIN: No rashes or lesions

## 2020-05-12 NOTE — H&P ADULT - ASSESSMENT
70 year old female with HFrEF, dilated cardiomyopathy (EF 25%) c/b severe MR, hypertension, diabetes, presenting with chest pain , SOB and lightheadedness. Symptoms likely in the setting of worsening cardiomyopathy/ HF. Low evidence of support for ACS at this time.

## 2020-05-12 NOTE — H&P ADULT - NSICDXPASTMEDICALHX_GEN_ALL_CORE_FT
PAST MEDICAL HISTORY:  Dilated cardiomyopathy     DM (diabetes mellitus) Type II    Heart failure     HTN (hypertension)

## 2020-05-12 NOTE — CONSULT NOTE ADULT - SUBJECTIVE AND OBJECTIVE BOX
Cardiology/Vascular Medicine Inpatient Consultation Note    Date of Admission:        CHIEF COMPLAINT:        HISTORY OF PRESENT ILLNESS:  HPI:          Allergies    penicillin (Unknown)    Intolerances    	    MEDICATIONS:  furosemide   Injectable 40 milliGRAM(s) IV Push Once                  PAST MEDICAL & SURGICAL HISTORY:  Dilated cardiomyopathy  HTN (hypertension)  DM (diabetes mellitus): Type II  No significant past surgical history      FAMILY HISTORY:  No pertinent family history in first degree relatives      SOCIAL HISTORY:    [ ] Non-smoker  [ ] Smoker  [ ] Alcohol    REVIEW OF SYSTEMS:  CONSTITUTIONAL: No fever, weight loss, or fatigue  EYES: No eye pain, visual disturbances, or discharge  ENMT:  No difficulty hearing, tinnitus, vertigo; No sinus or throat pain  NECK: No pain or stiffness  RESPIRATORY: No cough, wheezing, chills or hemoptysis; No Shortness of Breath  CARDIOVASCULAR: No chest pain, palpitations, passing out, dizziness, or leg swelling  GASTROINTESTINAL: No abdominal or epigastric pain. No nausea, vomiting, or hematemesis; No diarrhea or constipation. No melena or hematochezia.  GENITOURINARY: No dysuria, frequency, hematuria, or incontinence  NEUROLOGICAL: No headaches, memory loss, loss of strength, numbness, or tremors  SKIN: No itching, burning, rashes, or lesions   LYMPH Nodes: No enlarged glands  ENDOCRINE: No heat or cold intolerance; No hair loss  MUSCULOSKELETAL: No joint pain or swelling; No muscle, back, or extremity pain  PSYCHIATRIC: No depression, anxiety, mood swings, or difficulty sleeping  HEME/LYMPH: No easy bruising, or bleeding gums  ALLERY AND IMMUNOLOGIC: No hives or eczema	    [ ] All others negative	  [ ] Unable to obtain    PHYSICAL EXAM:  T(C): 36.4 (05-12-20 @ 13:15), Max: 36.4 (05-12-20 @ 13:15)  HR: 86 (05-12-20 @ 13:15) (86 - 86)  BP: 149/88 (05-12-20 @ 13:15) (149/88 - 149/88)  RR: 22 (05-12-20 @ 13:15) (22 - 22)  SpO2: 100% (05-12-20 @ 13:15) (100% - 100%)  Wt(kg): --  I&O's Summary      Appearance: Normal	  HEENT:   Normal oral mucosa, PERRL, EOMI	  Lymphatic: No lymphadenopathy  Cardiovascular: Normal S1 S2, No JVD, No murmurs, No edema  Respiratory: Lungs clear to auscultation	  Psychiatry: A & O x 3, Mood & affect appropriate  Gastrointestinal:  Soft, Non-tender, + BS	  Skin: No rashes, No ecchymoses, No cyanosis	  Neurologic: Non-focal  Extremities: Normal range of motion, No clubbing, cyanosis or edema  Vascular: Peripheral pulses palpable 2+ bilaterally      LABS:	 	    CBC Full  -  ( 12 May 2020 13:53 )  WBC Count : 5.01 K/uL  Hemoglobin : 12.3 g/dL  Hematocrit : 38.2 %  Platelet Count - Automated : 251 K/uL  Mean Cell Volume : 83.6 fL  Mean Cell Hemoglobin : 26.9 pg  Mean Cell Hemoglobin Concentration : 32.2 %  Auto Neutrophil # : 3.59 K/uL  Auto Lymphocyte # : 0.85 K/uL  Auto Monocyte # : 0.47 K/uL  Auto Eosinophil # : 0.05 K/uL  Auto Basophil # : 0.04 K/uL  Auto Neutrophil % : 71.6 %  Auto Lymphocyte % : 17.0 %  Auto Monocyte % : 9.4 %  Auto Eosinophil % : 1.0 %  Auto Basophil % : 0.8 % Cardiology/Vascular Medicine Inpatient Consultation Note    HISTORY OF PRESENT ILLNESS:  Patient known to me from prior admission and office.  She is a 70 year old woman with recently diagnosed HFrEF, dilated cardiomyopathy (EF 25%) with severe function MR noted on prior echocardiogram, hypertension, DM2, presenting with chest pain , SOB and lightheadedness.  On her prior admission, she was noted to have elevated cardiac enzymes, and was medically managed during the ongoing COVID19 pandemic. No ischemic evaluation was performed at the time of the prior admission.    Over the past 2-3 days, the patient has noted progressive episodes of chest pain, which she describe as substernal chest pressure, with radiation to her left shoulder/arm. In conjunction with the pain, she has also been having intermittent shortness of breath and orthopnea.  Yesterday morning, symptoms progressed leading to admission.    Of note patient was recently hospitalized from 4/19-4/21/20 for similar symptoms.  She reports compliance with medications including Lasix.        Allergies  penicillin (Unknown)    MEDICATIONS:  aspirin enteric coated 81 milliGRAM(s) Oral daily  carvedilol 3.125 milliGRAM(s) Oral every 12 hours  clopidogrel Tablet 75 milliGRAM(s) Oral daily  enoxaparin Injectable 40 milliGRAM(s) SubCutaneous daily  furosemide   Injectable 20 milliGRAM(s) IV Push two times a day  losartan 50 milliGRAM(s) Oral daily  atorvastatin 40 milliGRAM(s) Oral at bedtime  dextrose 40% Gel 15 Gram(s) Oral once PRN  dextrose 50% Injectable 12.5 Gram(s) IV Push once  dextrose 50% Injectable 25 Gram(s) IV Push once  dextrose 50% Injectable 25 Gram(s) IV Push once  glucagon  Injectable 1 milliGRAM(s) IntraMuscular once PRN  insulin lispro (HumaLOG) corrective regimen sliding scale   SubCutaneous three times a day before meals  dextrose 5%. 1000 milliLiter(s) IV Continuous <Continuous>  potassium chloride    Tablet ER 40 milliEquivalent(s) Oral every 4 hours    PAST MEDICAL & SURGICAL HISTORY:  Heart failure  Dilated cardiomyopathy  HTN (hypertension)  DM (diabetes mellitus): Type II  No significant past surgical history    FAMILY HISTORY:  No pertinent family history in first degree relatives    SOCIAL HISTORY:    As above    REVIEW OF SYSTEMS:  As above, per chart note    PHYSICAL EXAM:  T(C): 36.8 (05-13-20 @ 06:00), Max: 37 (05-12-20 @ 21:32)  HR: 78 (05-13-20 @ 06:00) (58 - 86)  BP: 152/97 (05-13-20 @ 06:00) (123/83 - 173/106)  RR: 18 (05-13-20 @ 06:00) (18 - 26)  SpO2: 96% (05-13-20 @ 06:00) (96% - 100%)    Appearance: NAD  HEENT:   +JVD  Cardiovascular: Normal S1 S2, 3/6 SM  Respiratory: Decreased breath sounds bilateral bases  Psychiatry: Awake, alert  Extremities: No edema  Vascular: Peripheral pulses palpable 2+ bilaterally      LABS:	 	    CBC Full  -  ( 13 May 2020 06:33 )  WBC Count : 5.79 K/uL  Hemoglobin : 12.6 g/dL  Hematocrit : 40.6 %  Platelet Count - Automated : 228 K/uL  Mean Cell Volume : 84.6 fL  Mean Cell Hemoglobin : 26.3 pg  Mean Cell Hemoglobin Concentration : 31.0 %  Auto Neutrophil # : 2.79 K/uL  Auto Lymphocyte # : 2.03 K/uL  Auto Monocyte # : 0.73 K/uL  Auto Eosinophil # : 0.18 K/uL  Auto Basophil # : 0.05 K/uL  Auto Neutrophil % : 48.1 %  Auto Lymphocyte % : 35.1 %  Auto Monocyte % : 12.6 %  Auto Eosinophil % : 3.1 %  Auto Basophil % : 0.9 %    05-13    142  |  103  |  13  ----------------------------<  117<H>  3.3<L>   |  27  |  0.75  05-12    140  |  107  |  16  ----------------------------<  179<H>  4.4   |  20<L>  |  0.72    Ca    9.4      13 May 2020 06:33  Ca    8.8      12 May 2020 13:53  Phos  4.3     05-13  Mg     2.1     05-13    TPro  6.6  /  Alb  3.5  /  TBili  0.8  /  DBili  x   /  AST  51<H>  /  ALT  67<H>  /  AlkPhos  105  05-13  TPro  6.9  /  Alb  3.5  /  TBili  0.5  /  DBili  x   /  AST  81<H>  /  ALT  72<H>  /  AlkPhos  101  05-12      proBNP: Serum Pro-Brain Natriuretic Peptide: 5935 pg/mL (05-12-20 @ 13:53)    < from: Xray Chest 1 View-PORTABLE IMMEDIATE (05.12.20 @ 14:03) >    EXAM:  XR CHEST PORTABLE IMMED 1V        PROCEDURE DATE:  May 12 2020         INTERPRETATION:  CLINICAL INDICATION: dyspnea    EXAM:  Single frontal chest from 5/12/2020 at 1403. Compared to prior study from 4/19/2020.    IMPRESSION:  Ill-defined increased markings and hazy opacity in right lower lung suspicious for infectious process including from atypical viral etiologies, follow-up suggested. Hazy indistinct right CP angle compatible with small right pleural effusion. No gross left pleural effusion. Clear remaining visualized lungs. No pneumothorax.    Stable cardiomegaly.    Trachea midline.    Unremarkable osseous structures.    DISCRETE X-RAY DATA:  Percent of LEFT lung opacification: Clear  Percent of RIGHT lung opacification: 1-33%  Change in lung opacification from most recent x-ray (<=3 days): No Prior  Change from prior dated 3 or more days (same admission): Aubrey QUAN M.D., ATTENDING RADIOLOGIST  This document has been electronically signed. May 12 2020  2:14PM          < from: Transthoracic Echocardiogram (04.19.20 @ 08:36) >    Patient name: LALY MAKI  YOB: 1950   Age: 70 (F)   MR#: [id number]  Study Date: 4/19/2020  Location: O/PSonographer: Soledad Howe RDCS  Study quality: Technically good  Referring Physician: Not Available Doctor, MD  Blood Pressure: 122/75 mmHg  Height: 165 cm  Weight: 55 kg  BSA: 1.6 m2  Heart Rhythm: Normal sinus  Heart Rate: 64 mmHg  ------------------------------------------------------------------------  PROCEDURE: Transthoracic echocardiogram with 2-D, M-Mode  and complete spectral and color flow Doppler.  INDICATION: Chest pain, unspecified (R07.9)  ------------------------------------------------------------------------  DIMENSIONS:  Dimensions:     Normal Values:  LA:     4.4 cm    2.0 - 4.0 cm  Ao:     3.8 cm    2.0 - 3.8 cm  SEPTUM: 0.9 cm    0.6 - 1.2 cm  PWT:    0.8 cm    0.6 - 1.1 cm  LVIDd:  6.6 cm    3.0 - 5.6 cm  LVIDs:  6.2 cm    1.8 - 4.0 cm  Derived Variables:  LVMI: 147 g/m2  RWT: 0.24  Ejection Fraction (Visual Estimate): 25 %  Peak Velocity (m/sec): TV=3.0  ------------------------------------------------------------------------  OBSERVATIONS:  Mitral Valve: Normal appearing mitral valve leaflets.    Severe functional mitral regurgitation.  Aortic Root: Normal size aortic root.  Aortic Valve: Normal aortic valve. Mild aortic  regurgitation.  Left Atrium: Severely dilated left atrium.  Left Ventricle: Severe left ventricular enlargement.  Severely dilated left ventricle. Diffuse hypokinesis.  Severe reversible diastolic dysfunction (Stage III).  Right Heart: Normal right atrium. Normal right ventricular  size and function. Normal tricuspid valve.  Mild tricuspid  regurgitation. Normal pulmonic valve. Mild pulmonic  regurgitation.  Pericardium/PleuraNormal pericardium with trace pericardial  effusion.  Hemodynamic: Estimated right atrial pressure is mildly  elevated.  Mild pulmonary hypertension. Estimated PASP 44 mmHg.  No PFO seen with color Doppler.  ------------------------------------------------------------------------  CONCLUSIONS:  Severely dilated left ventricle. Diffuse hypokinesis.  Severe functional mitral regurgitation.  Mild pulmonary hypertension.  ------------------------------------------------------------------------  Confirmed on  4/19/2020 - 10:32:55 by Dontae Worley M.D.  ------------------------------------------------------------------------    < end of copied text >

## 2020-05-12 NOTE — ED ADULT NURSE NOTE - OBJECTIVE STATEMENT
Pt received from intake c/o chest pain, SOB and dizziness x 6 days. Pt noted with O2 sat of 100% on room air and on nasal cannula for comfort. Pt labs sent and 20G in the right AC. VS noted, NSR on cardiac monitor and lasix given as per eMar. Eval in progress and pt awaiting admission.

## 2020-05-12 NOTE — H&P ADULT - PROBLEM SELECTOR PLAN 6
Pt with chronically elevated LFTS   -Hep panel on last admission negative  -consider RUQUS for further eval

## 2020-05-12 NOTE — ED PROVIDER NOTE - CLINICAL SUMMARY MEDICAL DECISION MAKING FREE TEXT BOX
ptnt presents with acute on chronic cp/sob-Dr. Sommer saw patient at bedside on her arrival, recommending diuresis with 40mg iv lasix as patient likely fluid overloaded, ordered cxr/labs/ekg to eval for elec disturbance, acs, chf exac, organ dysfunction, pna, patient moved to main ed immediately after initial eval for closer monitoring after initial evaluation , s/o to dr. pérez who is adopting care

## 2020-05-12 NOTE — ED ADULT TRIAGE NOTE - CHIEF COMPLAINT QUOTE
pt. c/o chest pain, shortness of breath and dizziness x 2-3 days, states "I feel like I'm going to pass out". PMHx HTN, DM.

## 2020-05-12 NOTE — REVIEW OF SYSTEMS
[Negative] : Endocrine [Dyspnea on exertion] : dyspnea during exertion [Shortness Of Breath] : shortness of breath [Chest  Pressure] : chest pressure

## 2020-05-12 NOTE — REASON FOR VISIT
[FreeTextEntry1] : Consent obtained. [employer] Touchpoint not working. [employer] telemedicine platform used.\par \par Ms Godoy was last evaluated via Telemedicine on 5/5/20.  She reports not feeling well, as she has remained dyspneic and orthopneic despite increasing diuretic therapy at home.  She has also noted being dizzy at home.  Of noted, she was diagnosed with ADHF, new finding of cardiomyopathy, severe functional MR on her recent [facility] admission.  She was medically managed as the hospital was under lockdown due to the ongoing COVID pandemic.  She was advised to present to the ER for evaluation/admission for IV diuresis and ischemic evaluation (LHC/RHC).\par \par Hospital Course: \par Discharge Date	21-Apr-2020 \par Admission Date	19-Apr-2020 00:59 \par Reason for Admission	Left sided chest pain, SOB and palpitations \par Hospital Course	 \par HPI: \par 69 y/o F with PMH of DM type II, HTN presents to ED with the compliant of left \par sided chest pain, palpitations, SUBRAMANIAN since Wednesday. As per the patient she was \par in her usual state of health and developed left sided chest pain gradually. \par Patient described the chest pain as a heaviness sensation, intermittent, \par aggravated with exertion and relief with rest, with radiation of the chest pain \par to the left shoulder, with a severity of 5/10. Patient also endorsed of SOB and \par SUBRAMANIAN that has been chronic for her. Patient stated that she has chronic Hx of \par bilateral LE swelling and endorsed of 3 pillow orthopnea. Patient denied any \par prior cardiac workup with NST or TTE. Patient denied any fevers, chills, \par N/V/D/C, abdominal pain, dysuria, melena, hematochezia, recent travel, sick \par contact, pleuritic or positional chest pain. \par \par On ED admission EKG revealed NSR at a rate of 92 with possible LAE, LBBB with \par QTc of 504, CE x 1: Trop: 18, Na: 133, K: 5.6->severely hemolyzed, Gluc: 169, \par AST: 150, ALT: 110. Prelim CXR: There is blunting of the perihilar vessels \par concerning for mild pulmonary edema. Small left pleural effusion. In the ED \par patient was given Aspirin 325mg. When examined patient is resting in the \par stretcher and denied any current chest pain or SOB. \par \par Hospital Course: \par Admitted for presumed NSTEMI. Troponins continued to increase, loaded with ASA \par in the ED and Plavix on the floor. Held off on heparin gtt as per cards given \par that pt was not having any chest pain and troponins were mildly elevated. TTE \par showed EF 25%, severely dilated LV. Cardiology recommending medical management \par with coreg, losartan, DAPT, atorvastatin. Patient made aware to follow up with \par cards within a week, [phone], within 1 week post discharge followup via \par telehealth. \par \par \par Med Reconciliation: \par Medication Reconciliation Status	Admission Reconciliation is Completed \par Discharge Reconciliation is Completed \par \par Discharge Medications	Aspirin Enteric Coated 81 mg oral delayed release tablet: \par 1 tab(s) orally once a day \par atorvastatin 40 mg oral tablet: 1 tab(s) orally once a day (at bedtime) \par metFORMIN 500 mg oral tablet: 1 tab(s) orally 2 times a day \par \par \par Care Plan/Procedures: \par Goal(s)	To get better and follow your care plan as instructed. \par Discharge Diagnoses, Assessment and Plan of Treatment	PRINCIPAL DISCHARGE \par DIAGNOSIS \par Diagnosis: Chest pain \par Assessment and Plan of Treatment: You were having chest pain due to a lack of \par blood flow to the heart muscle, as indicated by the laboratory tests. You had \par an echocardiogram which showed that the heart muscle is severely weakened. You \par were treated with aspirin and clopidogrel (Plavix). Please continue to take \par your medications as prescribed and follow up with your cardiologist and your \par primary care doctor. Please follow up within a week, with cardiology, please \par call [phone] to schedule an appointment \par \par

## 2020-05-12 NOTE — H&P ADULT - NSHPLABSRESULTS_GEN_ALL_CORE
Personally reviewed labs.   Personally reviewed imaging.   Personally reviewed EKG.                           12.3   5.01  )-----------( 251      ( 12 May 2020 13:53 )             38.2       05-12    140  |  107  |  16  ----------------------------<  179<H>  4.4   |  20<L>  |  0.72    Ca    8.8      12 May 2020 13:53    TPro  6.9  /  Alb  3.5  /  TBili  0.5  /  DBili  x   /  AST  81<H>  /  ALT  72<H>  /  AlkPhos  101  05-12        LIVER FUNCTIONS - ( 12 May 2020 13:53 )  Alb: 3.5 g/dL / Pro: 6.9 g/dL / ALK PHOS: 101 u/L / ALT: 72 u/L / AST: 81 u/L / GGT: x           < from: Xray Chest 1 View-PORTABLE IMMEDIATE (05.12.20 @ 14:03) >      EXAM:  XR CHEST PORTABLE IMMED 1V        PROCEDURE DATE:  May 12 2020 IMPRESSION:  Ill-defined increased markings and hazy opacity in right lower lung suspicious for infectious process including from atypical viral etiologies, follow-up suggested. Hazy indistinct right CP angle compatible with small right pleural effusion. No gross left pleural effusion. Clear remaining visualized lungs. No pneumothorax.    Stable cardiomegaly.    Trachea midline.    Unremarkable osseous structures.    < end of copied text >

## 2020-05-12 NOTE — H&P ADULT - PROBLEM SELECTOR PLAN 3
-Dilated cardiomyopathy, unclear cause. Complicated by severe MR  -c/w carvedilol 3.125 BID (? Increase as tolerated  -c/w Losartan 50mg for afterload reduction   -c/w DAPT   -F/U TTE  -F/U cardiology recs -Dilated cardiomyopathy, unclear cause. Complicated by severe MR  -c/w carvedilol 3.125 BID (? Increase as tolerated)  -c/w Losartan 50mg for afterload reduction   -c/w DAPT   -F/U TTE  -F/U cardiology recs

## 2020-05-12 NOTE — ED PROVIDER NOTE - RESPIRATORY [+], MLM
Dr barnhart called in to check on pt, new orders for labetalol   x 1 month; worsening over the last 2-3 days/EXERTIONAL DYSPNEA/SHORTNESS OF BREATH/ORTHOPNEA

## 2020-05-12 NOTE — H&P ADULT - PROBLEM SELECTOR PLAN 7
DVT: Lovenox  Diet: DASH/TLC Since last admission, unclear if mechanical etiology  -will obtain UA to assess for infection and/or hematuria  -currently no pelvic pain   -behavioral counselling and outpatient follow up

## 2020-05-12 NOTE — H&P ADULT - NSHPREVIEWOFSYSTEMS_GEN_ALL_CORE
Vital Signs Last 24 Hrs  T(C): 36.6 (12 May 2020 15:23), Max: 36.6 (12 May 2020 14:43)  T(F): 97.9 (12 May 2020 15:23), Max: 97.9 (12 May 2020 15:23)  HR: 71 (12 May 2020 15:23) (71 - 86)  BP: 144/71 (12 May 2020 15:23) (144/71 - 173/106)  BP(mean): --  RR: 21 (12 May 2020 15:23) (21 - 26)  SpO2: 100% (12 May 2020 15:23) (100% - 100%)    PHYSICAL EXAM:  GENERAL: NAD, well-developed  HEAD: Atraumatic, Normocephalic  EYES: EOMI, PERRLA, conjunctiva and sclera clear  NECK: Supple, No JVD  CHEST/LUNG: Clear to auscultation bilaterally; No wheezes/rales/rhonchi  HEART: Regular rate and rhythm; No murmurs, rubs, or gallops  ABDOMEN: Soft, Nontender, Nondistended; Bowel sounds present  EXTREMITIES:  2+ dP pulses b/l, No clubbing, cyanosis, or edema  PSYCH: reactive affect  NEUROLOGY: AAOx3, non-focal  SKIN: No rashes or lesions Constitutional: denies fevers, chills, night sweats, weight loss, +lightheadedness  HEENT: denies visual changes, hearing changes, rhinitis, odynophagia, or dysphagia  Cardiovascular: +Chest pain  Respiratory: +SOB worse with exertion   Gastrointestinal: denies N/V/D, abdominal pain, hematochezia, melena  : denies dysuria, hematuria  MSK: denies weakness, joint pain  Neuro: no numbness or tingling  Psych: no depression or anxiety  Skin: denies new rashes or masses

## 2020-05-12 NOTE — H&P ADULT - ATTENDING COMMENTS
70F with new found dilated cardiomyopathy, EF25% on last admission 1 month ago with NSTEMI at the time presented with chest pain and SOB. Pain radiates to left shoulder. worsen with exertion. trop neg x1. EKG with LBBB, no acute changes. clinically w/o signs of over decompensation or fluid overload. Patient would benefit from further ischemic evaluation. cardiology consulted, f/u recs.

## 2020-05-12 NOTE — CONSULT NOTE ADULT - ASSESSMENT
Patient is a 70 year old woman with recently diagnosed HFrEF, dilated cardiomyopathy (EF 25%) with severe functional MR, hypertension, DM2, presenting with progressively worsening chest pain ,SOB, and lightheadedness.     Will arrange for admission to Medicine.  Start IV Lasix 20 BID, monitor on telemetry, and arrange for LHC/RHC tomorrow if stable.       Problem/Plan - 1:  ·  Problem: Chest pain.  Plan: -Associated with intermittent lightheadedness and SUBRAMANIAN  -Likely in the setting of acute on chronic systolic CHF.  Has not had an ischemic evaluation, will arrange for LHC/RHC when symptoms stabilize  -c/w DAPT and atorvastatin   -COVID testing pending     Problem/Plan - 2:  ·  Problem: CHF (congestive heart failure).  Plan: HFrEF with EF of 25%  -patient with Bibasilar crackles. BNP 5935 decreased from previous admission   -c/w carvedilol, losartan   -Continue Lasix 20 IV BID, strict I/Os, daily standing wts, fluid restriction  -TTE as above.      Problem/Plan - 3:  ·  Problem: Dilated cardiomyopathy.  Plan: -Dilated cardiomyopathy, unclear cause. Complicated by severe MR  -c/w carvedilol 3.125 BID (Increase as tolerated)  -c/w Losartan 50mg for afterload reduction   -c/w DAPT   -F/U TTE     Problem/Plan - 4:  ·  Problem: DM (diabetes mellitus).  Plan: Last A1C 6.3, pt on metformin at home   -ISS, FS.      Problem/Plan - 5:  ·  Problem: HTN (hypertension).  Plan: c/w home meds.      Problem/Plan - 6:  ·  Problem: Prophylactic measure.  Plan: DVT: Lovenox  Diet: DASH/TLC  COVID: Follow up COVID PCR

## 2020-05-12 NOTE — H&P ADULT - PROBLEM SELECTOR PLAN 2
HFrEF with EF of 25%  -patient with Bibasilar crackles. BNP 5935 decreased from previous admission   -c/w carvedilol, losartan   -not on diuretics at home, s/p Lasix in the ED, will continue Lasix 20 IV BID   -TTE as above

## 2020-05-12 NOTE — ED PROVIDER NOTE - ATTENDING CONTRIBUTION TO CARE
Agree with above  on my exam  GEN -ill appearing; A+O x3   HEAD - NC/AT   EYES- PERRL, EOMI  ENT: Airway patent, mmm, Oral cavity and pharynx normal. No inflammation, swelling, exudate, or lesions.  NECK: Neck supple, non-tender without lymphadenopathy, no masses.  PULMONARY - crackles at bases, mildly tachypneic.   CARDIAC -s1s2, RR  ABDOMEN - +BS, ND, NT, soft, no guarding, no rebound, no masses   BACK - no CVA tenderness, Normal  spine   EXTREMITIES - FROM, symmetric pulses, capillary refill < 2 seconds, no edema   SKIN - no rash or bruising   NEUROLOGIC - alert, speech clear, no focal deficits  PSYCH -nl mood/affect, nl insight.  Dr. Sommer saw patient at bedside on her arrival, recommending diuresis with 40mg iv lasix, ordered cxr/labs/ekg, patient moved to main ed for closer monitoring after initial evaluation , s/o to dr. pérez who is adopting care Agree with above  on my exam  GEN -ill appearing; A+O x3   HEAD - NC/AT   EYES- PERRL, EOMI  ENT: Airway patent, mmm, Oral cavity and pharynx normal. No inflammation, swelling, exudate, or lesions.  NECK: Neck supple, non-tender without lymphadenopathy, no masses.  PULMONARY - crackles at bases, mildly tachypneic.   CARDIAC -s1s2, RR  ABDOMEN - +BS, ND, NT, soft, no guarding, no rebound, no masses   BACK - no CVA tenderness, Normal  spine   EXTREMITIES - FROM, symmetric pulses, capillary refill < 2 seconds, no edema   SKIN - no rash or bruising   NEUROLOGIC - alert, speech clear, no focal deficits  PSYCH -nl mood/affect, nl insight.  Dr. Sommer saw patient at bedside on her arrival, recommending diuresis with 40mg iv lasix, ordered cxr/labs/ekg, patient moved to main ed immediately after initial eval for closer monitoring after initial evaluation , s/o to dr. pérez who is adopting care

## 2020-05-12 NOTE — H&P ADULT - PROBLEM SELECTOR PLAN 8
Transitions of Care Status:  1.  Name of PCP:  2.  PCP Contacted on Admission: [ ] Y    [ ] N    3.  PCP contacted at Discharge: [ ] Y    [ ] N    [ ] N/A  4.  Post-Discharge Appointment Date and Location:  5.  Summary of Handoff given to PCP: DVT: Lovenox  Diet: DASH/TLC DVT: Lovenox  Diet: DASH/TLC  COVID: Follow up COVID PCR (moderate suspicion given mild SOB and lymphopenia)

## 2020-05-12 NOTE — H&P ADULT - HISTORY OF PRESENT ILLNESS
70 year old female with dilated cardiomyopathy (EF 25%) c/b severe MR, hypertension, diabetes, presenting with chest pain , SOB and lightheadedness. Patient reported that for the past two to three days she has been experiencing intermittent episodes of chest pain, which she describe as pressure, and radiating to the left shoulder/arm. In conjunction with the pain, she has also been having intermittent shortness of breath. This morning prior to presentation she experienced an episode of lightheadedness, however no syncopal episodes. She reported that symptoms are worsened by exertion. Of note patient was recently hospitalized from 4/19-4/21/20 for similar symptoms. Other complaints include urinary urgency which she has been experiencing since last admission; now she has to wear diapers. No associated dysuria or hematuria. No fevers, chills, nausea, vomiting, diarrhea. 70 year old female with HFrEF, dilated cardiomyopathy (EF 25%) c/b severe MR, hypertension, diabetes, presenting with chest pain , SOB and lightheadedness. Patient reported that for the past two to three days she has been experiencing intermittent episodes of chest pain, which she describe as pressure, and radiating to the left shoulder/arm. In conjunction with the pain, she has also been having intermittent shortness of breath. This morning prior to presentation she experienced an episode of lightheadedness, however no syncopal episodes. She reported that symptoms are worsened by exertion. Of note patient was recently hospitalized from 4/19-4/21/20 for similar symptoms. Other complaints include urinary urgency which she has been experiencing since last admission; now she has to wear diapers. No associated dysuria or hematuria. No fevers, chills, nausea, vomiting, diarrhea.

## 2020-05-13 ENCOUNTER — TRANSCRIPTION ENCOUNTER (OUTPATIENT)
Age: 70
End: 2020-05-13

## 2020-05-13 LAB
ALBUMIN SERPL ELPH-MCNC: 3.5 G/DL — SIGNIFICANT CHANGE UP (ref 3.3–5)
ALP SERPL-CCNC: 105 U/L — SIGNIFICANT CHANGE UP (ref 40–120)
ALT FLD-CCNC: 67 U/L — HIGH (ref 4–33)
ANION GAP SERPL CALC-SCNC: 12 MMO/L — SIGNIFICANT CHANGE UP (ref 7–14)
APTT BLD: 30.5 SEC — SIGNIFICANT CHANGE UP (ref 27.5–36.3)
AST SERPL-CCNC: 51 U/L — HIGH (ref 4–32)
BASOPHILS # BLD AUTO: 0.05 K/UL — SIGNIFICANT CHANGE UP (ref 0–0.2)
BASOPHILS NFR BLD AUTO: 0.9 % — SIGNIFICANT CHANGE UP (ref 0–2)
BILIRUB SERPL-MCNC: 0.8 MG/DL — SIGNIFICANT CHANGE UP (ref 0.2–1.2)
BUN SERPL-MCNC: 13 MG/DL — SIGNIFICANT CHANGE UP (ref 7–23)
CALCIUM SERPL-MCNC: 9.4 MG/DL — SIGNIFICANT CHANGE UP (ref 8.4–10.5)
CHLORIDE SERPL-SCNC: 103 MMOL/L — SIGNIFICANT CHANGE UP (ref 98–107)
CO2 SERPL-SCNC: 27 MMOL/L — SIGNIFICANT CHANGE UP (ref 22–31)
CREAT SERPL-MCNC: 0.75 MG/DL — SIGNIFICANT CHANGE UP (ref 0.5–1.3)
EOSINOPHIL # BLD AUTO: 0.18 K/UL — SIGNIFICANT CHANGE UP (ref 0–0.5)
EOSINOPHIL NFR BLD AUTO: 3.1 % — SIGNIFICANT CHANGE UP (ref 0–6)
GLUCOSE BLDC GLUCOMTR-MCNC: 121 MG/DL — HIGH (ref 70–99)
GLUCOSE BLDC GLUCOMTR-MCNC: 193 MG/DL — HIGH (ref 70–99)
GLUCOSE BLDC GLUCOMTR-MCNC: 81 MG/DL — SIGNIFICANT CHANGE UP (ref 70–99)
GLUCOSE BLDC GLUCOMTR-MCNC: 97 MG/DL — SIGNIFICANT CHANGE UP (ref 70–99)
GLUCOSE SERPL-MCNC: 117 MG/DL — HIGH (ref 70–99)
HCT VFR BLD CALC: 40.6 % — SIGNIFICANT CHANGE UP (ref 34.5–45)
HGB BLD-MCNC: 12.6 G/DL — SIGNIFICANT CHANGE UP (ref 11.5–15.5)
IMM GRANULOCYTES NFR BLD AUTO: 0.2 % — SIGNIFICANT CHANGE UP (ref 0–1.5)
INR BLD: 1.13 — SIGNIFICANT CHANGE UP (ref 0.88–1.17)
LYMPHOCYTES # BLD AUTO: 2.03 K/UL — SIGNIFICANT CHANGE UP (ref 1–3.3)
LYMPHOCYTES # BLD AUTO: 35.1 % — SIGNIFICANT CHANGE UP (ref 13–44)
MAGNESIUM SERPL-MCNC: 2.1 MG/DL — SIGNIFICANT CHANGE UP (ref 1.6–2.6)
MCHC RBC-ENTMCNC: 26.3 PG — LOW (ref 27–34)
MCHC RBC-ENTMCNC: 31 % — LOW (ref 32–36)
MCV RBC AUTO: 84.6 FL — SIGNIFICANT CHANGE UP (ref 80–100)
MONOCYTES # BLD AUTO: 0.73 K/UL — SIGNIFICANT CHANGE UP (ref 0–0.9)
MONOCYTES NFR BLD AUTO: 12.6 % — SIGNIFICANT CHANGE UP (ref 2–14)
NEUTROPHILS # BLD AUTO: 2.79 K/UL — SIGNIFICANT CHANGE UP (ref 1.8–7.4)
NEUTROPHILS NFR BLD AUTO: 48.1 % — SIGNIFICANT CHANGE UP (ref 43–77)
NRBC # FLD: 0 K/UL — SIGNIFICANT CHANGE UP (ref 0–0)
PHOSPHATE SERPL-MCNC: 4.3 MG/DL — SIGNIFICANT CHANGE UP (ref 2.5–4.5)
PLATELET # BLD AUTO: 228 K/UL — SIGNIFICANT CHANGE UP (ref 150–400)
PMV BLD: 11.1 FL — SIGNIFICANT CHANGE UP (ref 7–13)
POTASSIUM SERPL-MCNC: 3.3 MMOL/L — LOW (ref 3.5–5.3)
POTASSIUM SERPL-SCNC: 3.3 MMOL/L — LOW (ref 3.5–5.3)
PROT SERPL-MCNC: 6.6 G/DL — SIGNIFICANT CHANGE UP (ref 6–8.3)
PROTHROM AB SERPL-ACNC: 13 SEC — SIGNIFICANT CHANGE UP (ref 9.8–13.1)
RBC # BLD: 4.8 M/UL — SIGNIFICANT CHANGE UP (ref 3.8–5.2)
RBC # FLD: 14.9 % — HIGH (ref 10.3–14.5)
SARS-COV-2 RNA SPEC QL NAA+PROBE: SIGNIFICANT CHANGE UP
SODIUM SERPL-SCNC: 142 MMOL/L — SIGNIFICANT CHANGE UP (ref 135–145)
WBC # BLD: 5.79 K/UL — SIGNIFICANT CHANGE UP (ref 3.8–10.5)
WBC # FLD AUTO: 5.79 K/UL — SIGNIFICANT CHANGE UP (ref 3.8–10.5)

## 2020-05-13 PROCEDURE — 93460 R&L HRT ART/VENTRICLE ANGIO: CPT | Mod: 26

## 2020-05-13 PROCEDURE — 76937 US GUIDE VASCULAR ACCESS: CPT | Mod: 26

## 2020-05-13 PROCEDURE — 99233 SBSQ HOSP IP/OBS HIGH 50: CPT

## 2020-05-13 PROCEDURE — 99152 MOD SED SAME PHYS/QHP 5/>YRS: CPT

## 2020-05-13 RX ORDER — POTASSIUM CHLORIDE 20 MEQ
40 PACKET (EA) ORAL EVERY 4 HOURS
Refills: 0 | Status: COMPLETED | OUTPATIENT
Start: 2020-05-13 | End: 2020-05-13

## 2020-05-13 RX ORDER — CARVEDILOL PHOSPHATE 80 MG/1
3.12 CAPSULE, EXTENDED RELEASE ORAL EVERY 12 HOURS
Refills: 0 | Status: DISCONTINUED | OUTPATIENT
Start: 2020-05-13 | End: 2020-05-14

## 2020-05-13 RX ADMIN — Medication 20 MILLIGRAM(S): at 06:03

## 2020-05-13 RX ADMIN — CARVEDILOL PHOSPHATE 3.12 MILLIGRAM(S): 80 CAPSULE, EXTENDED RELEASE ORAL at 06:02

## 2020-05-13 RX ADMIN — Medication 40 MILLIEQUIVALENT(S): at 09:12

## 2020-05-13 RX ADMIN — Medication 40 MILLIEQUIVALENT(S): at 12:25

## 2020-05-13 RX ADMIN — Medication 81 MILLIGRAM(S): at 16:11

## 2020-05-13 RX ADMIN — LOSARTAN POTASSIUM 50 MILLIGRAM(S): 100 TABLET, FILM COATED ORAL at 06:02

## 2020-05-13 RX ADMIN — CLOPIDOGREL BISULFATE 75 MILLIGRAM(S): 75 TABLET, FILM COATED ORAL at 12:25

## 2020-05-13 NOTE — PROGRESS NOTE ADULT - SUBJECTIVE AND OBJECTIVE BOX
Cardiology/Vascular Medicine Inpatient Progress Note      Feels better after IV diuresis  No interval events noted on telemetry  COVID PCR test negative 5/12/20    Will arrange for LHC/RHC today    Vital Signs Last 24 Hrs  T(C): 37.1 (13 May 2020 09:08), Max: 37.1 (13 May 2020 09:08)  T(F): 98.7 (13 May 2020 09:08), Max: 98.7 (13 May 2020 09:08)  HR: 64 (13 May 2020 09:08) (58 - 86)  BP: 131/66 (13 May 2020 09:08) (123/83 - 173/106)  BP(mean): --  RR: 18 (13 May 2020 09:08) (18 - 26)  SpO2: 100% (13 May 2020 09:08) (96% - 100%)    I&O's Detail    13 May 2020 07:01  -  13 May 2020 09:38  --------------------------------------------------------  IN:  Total IN: 0 mL    OUT:    Voided: 900 mL  Total OUT: 900 mL    Total NET: -900 mL    Appearance: NAD, laying flat in bed  HEENT:  +JVD  Cardiovascular: Normal S1 S2, 3/6 SM  Respiratory: Decreased breath sounds bilateral bases  Psychiatry: Awake, alert  Extremities: No edema    MEDICATIONS  (STANDING):  aspirin enteric coated 81 milliGRAM(s) Oral daily  atorvastatin 40 milliGRAM(s) Oral at bedtime  carvedilol 3.125 milliGRAM(s) Oral every 12 hours  clopidogrel Tablet 75 milliGRAM(s) Oral daily  dextrose 5%. 1000 milliLiter(s) (50 mL/Hr) IV Continuous <Continuous>  dextrose 50% Injectable 12.5 Gram(s) IV Push once  dextrose 50% Injectable 25 Gram(s) IV Push once  dextrose 50% Injectable 25 Gram(s) IV Push once  enoxaparin Injectable 40 milliGRAM(s) SubCutaneous daily  furosemide   Injectable 20 milliGRAM(s) IV Push two times a day  insulin lispro (HumaLOG) corrective regimen sliding scale   SubCutaneous three times a day before meals  losartan 50 milliGRAM(s) Oral daily  potassium chloride    Tablet ER 40 milliEquivalent(s) Oral every 4 hours    MEDICATIONS  (PRN):  dextrose 40% Gel 15 Gram(s) Oral once PRN Blood Glucose LESS THAN 70 milliGRAM(s)/deciliter  glucagon  Injectable 1 milliGRAM(s) IntraMuscular once PRN Glucose LESS THAN 70 milligrams/deciliter      LABS:	 	                          12.6   5.79  )-----------( 228      ( 13 May 2020 06:33 )             40.6     05-13    142  |  103  |  13  ----------------------------<  117<H>  3.3<L>   |  27  |  0.75    Ca    9.4      13 May 2020 06:33  Phos  4.3     05-13  Mg     2.1     05-13    TPro  6.6  /  Alb  3.5  /  TBili  0.8  /  DBili  x   /  AST  51<H>  /  ALT  67<H>  /  AlkPhos  105  05-13    PT/INR - ( 13 May 2020 06:33 )   PT: 13.0 SEC;   INR: 1.13          PTT - ( 13 May 2020 06:33 )  PTT:30.5 SEC    proBNP: Serum Pro-Brain Natriuretic Peptide: 5935 pg/mL (05-12-20 @ 13:53)    < from: Xray Chest 1 View-PORTABLE IMMEDIATE (05.12.20 @ 14:03) >    EXAM:  XR CHEST PORTABLE IMMED 1V        PROCEDURE DATE:  May 12 2020         INTERPRETATION:  CLINICAL INDICATION: dyspnea    EXAM:  Single frontal chest from 5/12/2020 at 1403. Compared to prior study from 4/19/2020.    IMPRESSION:  Ill-defined increased markings and hazy opacity in right lower lung suspicious for infectious process including from atypical viral etiologies, follow-up suggested. Hazy indistinct right CP angle compatible with small right pleural effusion. No gross left pleural effusion. Clear remaining visualized lungs. No pneumothorax.    Stable cardiomegaly.    Trachea midline.    Unremarkable osseous structures.    DISCRETE X-RAY DATA:  Percent of LEFT lung opacification: Clear  Percent of RIGHT lung opacification: 1-33%  Change in lung opacification from most recent x-ray (<=3 days): No Prior  Change from prior dated 3 or more days (same admission): Increase      WINTER QUAN M.D., ATTENDING RADIOLOGIST  This document has been electronically signed. May 12 2020  2:14PM          < from: Transthoracic Echocardiogram (04.19.20 @ 08:36) >    Patient name: LALY MAKI  YOB: 1950   Age: 70 (F)   MR#: [id number]  Study Date: 4/19/2020  Location: O/PSonographer: [facility]  Study quality: Technically good  Referring Physician: Not Available Doctor, MD  Blood Pressure: 122/75 mmHg  Height: 165 cm  Weight: 55 kg  BSA: 1.6 m2  Heart Rhythm: Normal sinus  Heart Rate: 64 mmHg  ------------------------------------------------------------------------  PROCEDURE: Transthoracic echocardiogram with 2-D, M-Mode  and complete spectral and color flow Doppler.  INDICATION: Chest pain, unspecified (R07.9)  ------------------------------------------------------------------------  DIMENSIONS:  Dimensions:     Normal Values:  LA:     4.4 cm    2.0 - 4.0 cm  Ao:     3.8 cm    2.0 - 3.8 cm  SEPTUM: 0.9 cm    0.6 - 1.2 cm  PWT:    0.8 cm    0.6 - 1.1 cm  LVIDd:  6.6 cm    3.0 - 5.6 cm  LVIDs:  6.2 cm    1.8 - 4.0 cm  Derived Variables:  LVMI: 147 g/m2  RWT: 0.24  Ejection Fraction (Visual Estimate): 25 %  Peak Velocity (m/sec): TV=3.0  ------------------------------------------------------------------------  OBSERVATIONS:  Mitral Valve: Normal appearing mitral valve leaflets.    Severe functional mitral regurgitation.  Aortic Root: Normal size aortic root.  Aortic Valve: Normal aortic valve. Mild aortic  regurgitation.  Left Atrium: Severely dilated left atrium.  Left Ventricle: Severe left ventricular enlargement.  Severely dilated left ventricle. Diffuse hypokinesis.  Severe reversible diastolic dysfunction (Stage III).  Right Heart: Normal right atrium. Normal right ventricular  size and function. Normal tricuspid valve.  Mild tricuspid  regurgitation. Normal pulmonic valve. Mild pulmonic  regurgitation.  Pericardium/PleuraNormal pericardium with trace pericardial  effusion.  Hemodynamic: Estimated right atrial pressure is mildly  elevated.  Mild pulmonary hypertension. Estimated PASP 44 mmHg.  No PFO seen with color Doppler.  ------------------------------------------------------------------------  CONCLUSIONS:  Severely dilated left ventricle. Diffuse hypokinesis.  Severe functional mitral regurgitation.  Mild pulmonary hypertension.  ------------------------------------------------------------------------  Confirmed on  4/19/2020 - 10:32:55 by Dontae Worley M.D.  ------------------------------------------------------------------------    < end of copied text >

## 2020-05-13 NOTE — PROGRESS NOTE ADULT - SUBJECTIVE AND OBJECTIVE BOX
Medicine Progress Note    Patient is a 70y old  Female who presents with a chief complaint of Chest pain, SUBRAMANIAN (13 May 2020 09:33)      SUBJECTIVE / OVERNIGHT EVENTS:    ADDITIONAL REVIEW OF SYSTEMS:    MEDICATIONS  (STANDING):  aspirin enteric coated 81 milliGRAM(s) Oral daily  atorvastatin 40 milliGRAM(s) Oral at bedtime  carvedilol 3.125 milliGRAM(s) Oral every 12 hours  clopidogrel Tablet 75 milliGRAM(s) Oral daily  dextrose 5%. 1000 milliLiter(s) (50 mL/Hr) IV Continuous <Continuous>  dextrose 50% Injectable 12.5 Gram(s) IV Push once  dextrose 50% Injectable 25 Gram(s) IV Push once  dextrose 50% Injectable 25 Gram(s) IV Push once  enoxaparin Injectable 40 milliGRAM(s) SubCutaneous daily  furosemide   Injectable 20 milliGRAM(s) IV Push two times a day  insulin lispro (HumaLOG) corrective regimen sliding scale   SubCutaneous three times a day before meals  losartan 50 milliGRAM(s) Oral daily  potassium chloride    Tablet ER 40 milliEquivalent(s) Oral every 4 hours    MEDICATIONS  (PRN):  dextrose 40% Gel 15 Gram(s) Oral once PRN Blood Glucose LESS THAN 70 milliGRAM(s)/deciliter  glucagon  Injectable 1 milliGRAM(s) IntraMuscular once PRN Glucose LESS THAN 70 milligrams/deciliter    CAPILLARY BLOOD GLUCOSE      POCT Blood Glucose.: 103 mg/dL (13 May 2020 08:40)  POCT Blood Glucose.: 125 mg/dL (12 May 2020 22:24)  POCT Blood Glucose.: 109 mg/dL (12 May 2020 18:04)  POCT Blood Glucose.: 176 mg/dL (12 May 2020 13:14)    I&O's Summary    13 May 2020 07:01  -  13 May 2020 10:20  --------------------------------------------------------  IN: 0 mL / OUT: 900 mL / NET: -900 mL        PHYSICAL EXAM:  Vital Signs Last 24 Hrs  T(C): 37.1 (13 May 2020 09:08), Max: 37.1 (13 May 2020 09:08)  T(F): 98.7 (13 May 2020 09:08), Max: 98.7 (13 May 2020 09:08)  HR: 64 (13 May 2020 09:08) (58 - 86)  BP: 131/66 (13 May 2020 09:08) (123/83 - 173/106)  BP(mean): --  RR: 18 (13 May 2020 09:08) (18 - 26)  SpO2: 100% (13 May 2020 09:08) (96% - 100%)  CONSTITUTIONAL: NAD, well-developed, well-groomed  ENMT: Moist oral mucosa, no pharyngeal injection or exudates; normal dentition  RESPIRATORY: Normal respiratory effort; lungs are clear to auscultation bilaterally  CARDIOVASCULAR: Regular rate and rhythm, normal S1 and S2, no murmur/rub/gallop; No lower extremity edema; Peripheral pulses are 2+ bilaterally  ABDOMEN: Nontender to palpation, normoactive bowel sounds, no rebound/guarding; No hepatosplenomegaly  PSYCH: A+O to person, place, and time; affect appropriate  NEUROLOGY: CN 2-12 are intact and symmetric; no gross sensory deficits   SKIN: No rashes; no palpable lesions    LABS:                        12.6   5.79  )-----------( 228      ( 13 May 2020 06:33 )             40.6     05-13    142  |  103  |  13  ----------------------------<  117<H>  3.3<L>   |  27  |  0.75    Ca    9.4      13 May 2020 06:33  Phos  4.3     05-13  Mg     2.1     05-13    TPro  6.6  /  Alb  3.5  /  TBili  0.8  /  DBili  x   /  AST  51<H>  /  ALT  67<H>  /  AlkPhos  105  05-13    PT/INR - ( 13 May 2020 06:33 )   PT: 13.0 SEC;   INR: 1.13          PTT - ( 13 May 2020 06:33 )  PTT:30.5 SEC      Urinalysis Basic - ( 12 May 2020 17:20 )    Color: COLORLESS / Appearance: CLEAR / S.008 / pH: 6.5  Gluc: NEGATIVE / Ketone: NEGATIVE  / Bili: NEGATIVE / Urobili: NORMAL   Blood: NEGATIVE / Protein: NEGATIVE / Nitrite: NEGATIVE   Leuk Esterase: NEGATIVE / RBC: x / WBC x   Sq Epi: x / Non Sq Epi: x / Bacteria: x        COVID-19 PCR: NotDetec (12 May 2020 16:34)      RADIOLOGY & ADDITIONAL TESTS:  Imaging from Last 24 Hours:    Electrocardiogram/QTc Interval:    COORDINATION OF CARE:  Care Discussed with Consultants/Other Providers: Medicine Progress Note    Patient is a 70y old  Female who presents with a chief complaint of Chest pain, SUBRAMANIAN (13 May 2020 09:33)      SUBJECTIVE / OVERNIGHT EVENTS: This am, patient is doing well. Reports improvement in breathing. No chest pain.     ADDITIONAL REVIEW OF SYSTEMS:    MEDICATIONS  (STANDING):  aspirin enteric coated 81 milliGRAM(s) Oral daily  atorvastatin 40 milliGRAM(s) Oral at bedtime  carvedilol 3.125 milliGRAM(s) Oral every 12 hours  clopidogrel Tablet 75 milliGRAM(s) Oral daily  dextrose 5%. 1000 milliLiter(s) (50 mL/Hr) IV Continuous <Continuous>  dextrose 50% Injectable 12.5 Gram(s) IV Push once  dextrose 50% Injectable 25 Gram(s) IV Push once  dextrose 50% Injectable 25 Gram(s) IV Push once  enoxaparin Injectable 40 milliGRAM(s) SubCutaneous daily  furosemide   Injectable 20 milliGRAM(s) IV Push two times a day  insulin lispro (HumaLOG) corrective regimen sliding scale   SubCutaneous three times a day before meals  losartan 50 milliGRAM(s) Oral daily  potassium chloride    Tablet ER 40 milliEquivalent(s) Oral every 4 hours    MEDICATIONS  (PRN):  dextrose 40% Gel 15 Gram(s) Oral once PRN Blood Glucose LESS THAN 70 milliGRAM(s)/deciliter  glucagon  Injectable 1 milliGRAM(s) IntraMuscular once PRN Glucose LESS THAN 70 milligrams/deciliter    CAPILLARY BLOOD GLUCOSE      POCT Blood Glucose.: 103 mg/dL (13 May 2020 08:40)  POCT Blood Glucose.: 125 mg/dL (12 May 2020 22:24)  POCT Blood Glucose.: 109 mg/dL (12 May 2020 18:04)  POCT Blood Glucose.: 176 mg/dL (12 May 2020 13:14)    I&O's Summary    13 May 2020 07:01  -  13 May 2020 10:20  --------------------------------------------------------  IN: 0 mL / OUT: 900 mL / NET: -900 mL        PHYSICAL EXAM:  Vital Signs Last 24 Hrs  T(C): 37.1 (13 May 2020 09:08), Max: 37.1 (13 May 2020 09:08)  T(F): 98.7 (13 May 2020 09:08), Max: 98.7 (13 May 2020 09:08)  HR: 64 (13 May 2020 09:08) (58 - 86)  BP: 131/66 (13 May 2020 09:08) (123/83 - 173/106)  BP(mean): --  RR: 18 (13 May 2020 09:08) (18 - 26)  SpO2: 100% (13 May 2020 09:08) (96% - 100%)  CONSTITUTIONAL: Middle age woman in  NAD, well-developed, well-groomed  ENMT: Moist oral mucosa, no pharyngeal injection or exudates; normal dentition  RESPIRATORY: bibasilar crackles   CARDIOVASCULAR: Regular rate and rhythm, normal S1 and S2, no murmur/rub/gallop; No lower extremity edema; Peripheral pulses are 2+ bilaterally  ABDOMEN: Nontender to palpation, normoactive bowel sounds, no rebound/guarding; No hepatosplenomegaly  PSYCH: A+O to person, place, and time; affect appropriate  NEUROLOGY: no gross sensory deficits   SKIN: No rashes; no palpable lesions    LABS:                        12.6   5.79  )-----------( 228      ( 13 May 2020 06:33 )             40.6     05-13    142  |  103  |  13  ----------------------------<  117<H>  3.3<L>   |  27  |  0.75    Ca    9.4      13 May 2020 06:33  Phos  4.3     05-13  Mg     2.1     05-13    TPro  6.6  /  Alb  3.5  /  TBili  0.8  /  DBili  x   /  AST  51<H>  /  ALT  67<H>  /  AlkPhos  105  05-13    PT/INR - ( 13 May 2020 06:33 )   PT: 13.0 SEC;   INR: 1.13          PTT - ( 13 May 2020 06:33 )  PTT:30.5 SEC      Urinalysis Basic - ( 12 May 2020 17:20 )    Color: COLORLESS / Appearance: CLEAR / S.008 / pH: 6.5  Gluc: NEGATIVE / Ketone: NEGATIVE  / Bili: NEGATIVE / Urobili: NORMAL   Blood: NEGATIVE / Protein: NEGATIVE / Nitrite: NEGATIVE   Leuk Esterase: NEGATIVE / RBC: x / WBC x   Sq Epi: x / Non Sq Epi: x / Bacteria: x        COVID-19 PCR: NotDetec (12 May 2020 16:34)      RADIOLOGY & ADDITIONAL TESTS:  Imaging from Last 24 Hours:    Electrocardiogram/QTc Interval:    COORDINATION OF CARE:  Care Discussed with Consultants/Other Providers:

## 2020-05-13 NOTE — CHART NOTE - NSCHARTNOTEFT_GEN_A_CORE
Patient s/p cardiac cath, Rt wrist appears clean, dry, intact, no evidence of active bleeding, no hematoma, + pulses, good capilary refill. Continue to monitor.

## 2020-05-14 ENCOUNTER — TRANSCRIPTION ENCOUNTER (OUTPATIENT)
Age: 70
End: 2020-05-14

## 2020-05-14 VITALS
TEMPERATURE: 98 F | DIASTOLIC BLOOD PRESSURE: 81 MMHG | SYSTOLIC BLOOD PRESSURE: 131 MMHG | RESPIRATION RATE: 18 BRPM | HEART RATE: 79 BPM | OXYGEN SATURATION: 98 %

## 2020-05-14 DIAGNOSIS — U07.1 COVID-19: ICD-10-CM

## 2020-05-14 LAB
GLUCOSE BLDC GLUCOMTR-MCNC: 111 MG/DL — HIGH (ref 70–99)
GLUCOSE BLDC GLUCOMTR-MCNC: 200 MG/DL — HIGH (ref 70–99)

## 2020-05-14 PROCEDURE — 99239 HOSP IP/OBS DSCHRG MGMT >30: CPT

## 2020-05-14 PROCEDURE — 93306 TTE W/DOPPLER COMPLETE: CPT | Mod: 26

## 2020-05-14 PROCEDURE — 99232 SBSQ HOSP IP/OBS MODERATE 35: CPT

## 2020-05-14 RX ORDER — FUROSEMIDE 40 MG
1 TABLET ORAL
Qty: 30 | Refills: 0
Start: 2020-05-14 | End: 2020-06-12

## 2020-05-14 RX ORDER — FUROSEMIDE 40 MG
20 TABLET ORAL DAILY
Refills: 0 | Status: DISCONTINUED | OUTPATIENT
Start: 2020-05-14 | End: 2020-05-14

## 2020-05-14 RX ADMIN — Medication 81 MILLIGRAM(S): at 12:14

## 2020-05-14 RX ADMIN — CARVEDILOL PHOSPHATE 3.12 MILLIGRAM(S): 80 CAPSULE, EXTENDED RELEASE ORAL at 17:34

## 2020-05-14 RX ADMIN — CARVEDILOL PHOSPHATE 3.12 MILLIGRAM(S): 80 CAPSULE, EXTENDED RELEASE ORAL at 05:21

## 2020-05-14 RX ADMIN — ENOXAPARIN SODIUM 40 MILLIGRAM(S): 100 INJECTION SUBCUTANEOUS at 12:14

## 2020-05-14 RX ADMIN — LOSARTAN POTASSIUM 50 MILLIGRAM(S): 100 TABLET, FILM COATED ORAL at 05:22

## 2020-05-14 RX ADMIN — CLOPIDOGREL BISULFATE 75 MILLIGRAM(S): 75 TABLET, FILM COATED ORAL at 12:14

## 2020-05-14 RX ADMIN — Medication 2: at 13:15

## 2020-05-14 NOTE — PROGRESS NOTE ADULT - PROBLEM SELECTOR PLAN 2
HFrEF with EF of 25%  -patient with Bibasilar crackles. BNP 5935 decreased from previous admission   -c/w carvedilol, losartan   -not on diuretics at home, s/p Lasix in the ED, will continue Lasix 20 IV BID; BID lytes - replete for K/Mag 4/2    -TTE as above
HFrEF with EF of 25%  -patient with Bibasilar crackles. BNP 5935 decreased from previous admission   -c/w carvedilol, losartan   -not on diuretics at home, s/p Lasix in the ED, will continue Lasix 20 IV BID; BID lytes - replete for K/Mag 4/2    -TTE as above

## 2020-05-14 NOTE — PROGRESS NOTE ADULT - PROBLEM SELECTOR PLAN 6
Pt with chronically elevated LFTS   -Hep panel on last admission negative
Pt with chronically elevated LFTS   -Hep panel on last admission negative  -consider RUQUS for further eval

## 2020-05-14 NOTE — PROGRESS NOTE ADULT - PROBLEM SELECTOR PLAN 7
Since last admission, unclear if mechanical etiology  - UA unremarkable  for infection and/or hematuria  -currently no pelvic pain   -behavioral counselling and outpatient follow up
Since last admission, unclear if mechanical etiology  - UA unremarkable  for infection and/or hematuria  -currently no pelvic pain   -behavioral counselling and outpatient follow up

## 2020-05-14 NOTE — PROGRESS NOTE ADULT - PROBLEM SELECTOR PLAN 8
DVT: Lovenox  Diet: DASH/TLC  COVID: Follow up COVID PCR (moderate suspicion given mild SOB and lymphopenia) -> negative
DVT: Lovenox  Diet: DASH/TLC  COVID: Follow up COVID PCR (moderate suspicion given mild SOB and lymphopenia) -> negative

## 2020-05-14 NOTE — PROGRESS NOTE ADULT - SUBJECTIVE AND OBJECTIVE BOX
[facility] Division of Hospital Medicine  Mario Anderson MD  Pager [phone]    Patient is a 70y old  Female who presents with a chief complaint of Chest pain, SUBRAMANIAN (14 May 2020 08:55)      SUBJECTIVE / OVERNIGHT EVENTS: feels well      MEDICATIONS  (STANDING):  aspirin enteric coated 81 milliGRAM(s) Oral daily  atorvastatin 40 milliGRAM(s) Oral at bedtime  carvedilol 3.125 milliGRAM(s) Oral every 12 hours  clopidogrel Tablet 75 milliGRAM(s) Oral daily  dextrose 5%. 1000 milliLiter(s) (50 mL/Hr) IV Continuous <Continuous>  dextrose 50% Injectable 12.5 Gram(s) IV Push once  dextrose 50% Injectable 25 Gram(s) IV Push once  dextrose 50% Injectable 25 Gram(s) IV Push once  enoxaparin Injectable 40 milliGRAM(s) SubCutaneous daily  insulin lispro (HumaLOG) corrective regimen sliding scale   SubCutaneous three times a day before meals  losartan 50 milliGRAM(s) Oral daily    MEDICATIONS  (PRN):  dextrose 40% Gel 15 Gram(s) Oral once PRN Blood Glucose LESS THAN 70 milliGRAM(s)/deciliter  glucagon  Injectable 1 milliGRAM(s) IntraMuscular once PRN Glucose LESS THAN 70 milligrams/deciliter      CAPILLARY BLOOD GLUCOSE      POCT Blood Glucose.: 200 mg/dL (14 May 2020 12:38)  POCT Blood Glucose.: 111 mg/dL (14 May 2020 09:04)  POCT Blood Glucose.: 193 mg/dL (13 May 2020 22:24)  POCT Blood Glucose.: 97 mg/dL (13 May 2020 18:18)  POCT Blood Glucose.: 81 mg/dL (13 May 2020 16:24)    I&O's Summary    13 May 2020 07:01  -  14 May 2020 07:00  --------------------------------------------------------  IN: 240 mL / OUT: 1150 mL / NET: -910 mL        PHYSICAL EXAM:  Vital Signs Last 24 Hrs  T(C): 36.9 (14 May 2020 12:14), Max: 37.1 (13 May 2020 20:13)  T(F): 98.4 (14 May 2020 12:14), Max: 98.7 (13 May 2020 20:13)  HR: 72 (14 May 2020 12:14) (55 - 72)  BP: 126/74 (14 May 2020 12:14) (123/62 - 144/91)  BP(mean): --  RR: 18 (14 May 2020 12:14) (18 - 18)  SpO2: 98% (14 May 2020 12:14) (98% - 100%)  CONSTITUTIONAL: NAD  EYES: EOMI, conjunctiva and sclera clear  ENMT: Moist oral mucosa  NECK: Supple  RESPIRATORY: Breathing unlabored  CARDIOVASCULAR: not auscultated  ABDOMEN: Nondistended  MUSCULOSKELETAL: no clubbing or cyanosis of digits  NEUROLOGY: No focal deficits   SKIN: No rashes or lesions    LABS:                        12.6   5.79  )-----------( 228      ( 13 May 2020 06:33 )             40.6     05-13    142  |  103  |  13  ----------------------------<  117<H>  3.3<L>   |  27  |  0.75    Ca    9.4      13 May 2020 06:33  Phos  4.3       Mg     2.1         TPro  6.6  /  Alb  3.5  /  TBili  0.8  /  DBili  x   /  AST  51<H>  /  ALT  67<H>  /  AlkPhos  105  05-13    PT/INR - ( 13 May 2020 06:33 )   PT: 13.0 SEC;   INR: 1.13          PTT - ( 13 May 2020 06:33 )  PTT:30.5 SEC      Urinalysis Basic - ( 12 May 2020 17:20 )    Color: COLORLESS / Appearance: CLEAR / S.008 / pH: 6.5  Gluc: NEGATIVE / Ketone: NEGATIVE  / Bili: NEGATIVE / Urobili: NORMAL   Blood: NEGATIVE / Protein: NEGATIVE / Nitrite: NEGATIVE   Leuk Esterase: NEGATIVE / RBC: x / WBC x   Sq Epi: x / Non Sq Epi: x / Bacteria: x    < from: Cardiac Cath Lab - Adult (20 @ 17:10) >    [facility]  [id number] [place] [address] [phone]  Cath Lab Report -- Comprehensive Report  Patient: LALY MAKI  Study date: 2020  Account number: [id number]  MR number: [id number]  : 1950  Gender: Female  Race:  Amer  Case Physician(s):  Estela Harris M.D.  Referring Physician:  Ambrocio Sommer M.D.  INDICATIONS: Acute on chronic systolic congestive heart failure. Dilated  cardiomyopathy.  HISTORY: No history of previous myocardial infarction.  PROCEDURE:  --  Right heart catheterization.  --  Left heart catheterization.  --  Left coronary angiography.  --  Right coronary angiography.  --  Sonosite - Diagnostic.  TECHNIQUE: The risks and alternatives of the procedures and conscious  sedation were explained to the patient and informed consent was obtained.  Cardiac catheterization performed electively.  Local anesthetic given. Right brachial vein access. Local anesthetic given.  A 5 Fr. Radial Glidesheath was inserted in the vessel, utilizing the  modified Seldinger technique. Right radial artery access. Local anesthetic  given. A 5 Fr. Radial Glidesheath was inserted in the vessel, utilizing  the modified Seldinger technique. Right heart catheterization. The  procedure wasperformed utilizing a VIP [place] 7.5 Fr. catheter under  fluoroscopic guidance. Measurements of arterial and venous oxygen  saturation and cardiac output (by [place] using assumed VO2) were obtained.  The catheter was removed. Left heart catheterization. A 5 Fr. DxTerity TRA  catheter was utilized. After recording ascending aortic pressure, the  catheter was advanced across the aortic valve and left ventricular  pressure was recorded. Left coronary artery angiography. The vessel was  injected utilizing a 5 Fr. DxTerity TRA catheter and positioned in the  vessel ostia under fluoroscopic guidance. Angiography was performed in  multiple projections using hand-injection of contrast. Right coronary  artery angiography. The vessel was injected utilizinga 5 Fr. DxTerity TRA  catheter and positioned in the vessel ostia under fluoroscopic guidance.  Angiography was performed in multiple projections using hand-injection of  contrast. Sonosite - Diagnostic. RADIATION EXPOSURE: 1.6 min.  CONTRAST GIVEN: 21 ml Optiray.  MEDICATIONS GIVEN: Midazolam, 1 mg, IV. Fentanyl, 25 mcg, IV. Heparin, 3000  units, IV. 2% Lidocaine, 3 ml, subcutaneously. 2% Lidocaine, 3 ml,  subcutaneously. Cardene, 400 mcg. 0.9% Normal saline, 40 ml, IV.  VENTRICLES: No left ventriculogram was performed.  CORONARY VESSELS: The coronary circulation is right dominant.  LM:   --  LM: Angiography showed minor luminal irregularities with no flow  limiting lesions.  LAD:   --  LAD: Angiography showed minor luminal irregularities with no  flow limiting lesions.  --  D1: Angiography showed minor luminal irregularities with no flow  limiting lesions.  CX:   --  Circumflex: Angiography showed minor luminal irregularities with  no flow limiting lesions.  RCA:   --  RCA: Angiography showed mild atherosclerosis with no flow  limiting lesions.  --  RPDA: Normal.  --  RPLS: Normal.  COMPLICATIONS: No complications occurred during the cath lab visit.  DIAGNOSTIC IMPRESSIONS: There is mild irregularity of the coronary anatomy.  Right heart cath hemodynamics measurements as listed indicate well  compesated state.  Prepared and signed by  Estela Harris M.D.  Signed 2020 13:35:01  HEMODYNAMIC TABLES  Pressures:  Baseline  Pressures:  - HR: 61  Pressures:  - Rhythm:  Pressures:-- Aortic Pressure (S/D/M): 133/74/91  Pressures:  -- Left Ventricle (s/edp): 130/12/--  Pressures:  -- Pulmonary Artery (S/D/M): 46/15/29  Pressures:  -- Pulmonary Capillary Wedge: 12/15/8  Pressures:  -- Right Atrium (a/v/M): 8/8/5  Pressures:  -- Right Ventricle (s/edp): 46/8/--  O2 Sats:  Baseline  O2 Sats:  - HR: 61  O2 Sats:  - Rhythm:  O2 Sats:  -- AO: 12/93.7/15.29  O2 Sats:  -- PA: 11.9/65.3/10.57  Outputs:  Baseline  Outputs:  -- OUTPUTS: CO by [place]: 4.74  Outputs:  -- OUTPUTS: [place] cardiac index: 2.65  Outputs:  -- OUTPUTS: O2 consumption: 223.86  Outputs:  -- OUTPUTS: Vo2 Indexed: 125.00  Outputs:  -- RESISTANCES: Left ventricular stroke work: 53.50  Outputs:  -- RESISTANCES: Left Ventricular Stroke Work index: 29.87  Outputs:  --RESISTANCES: Pulmonary vascular index (dsc): 634.70  Outputs:  -- RESISTANCES: Pulmonary vascular index (Wood Units): 7.94  Outputs:  -- RESISTANCES: Pulmonary vascular resistance (dsc): 354.41  Outputs:  -- RESISTANCES: Pulmonary vascular resistance(Wood Units): 4.43  Outputs:  -- RESISTANCES: PVR_SVR Ratio: 0.24  Outputs:  -- RESISTANCES: Right ventricular stroke work: 22.75  Outputs:  -- RESISTANCES: Right ventricular stroke work index: 12.70  Outputs:  -- RESISTANCES: Systemic vascular index(dsc): 2599.27  Outputs:  -- RESISTANCES: Systemic vascular index (Wood Units): 32.50  Outputs:  -- RESISTANCES: Systemic vascular resistance (dsc): 1451.38  Outputs:  -- RESISTANCES: Systemic vascular resistance (Wood Units): 18.15  Outputs:  -- RESISTANCES: Total pulmonary index (dsc): 876.50  Outputs:  -- RESISTANCES: Total pulmonary index (Wood Units): 10.96  Outputs:  -- RESISTANCES: Total pulmonary resistance (dsc): 489.42  Outputs:  -- RESISTANCES: Total pulmonary resistance (Wood Units): 6.12  Outputs:  -- RESISTANCES: Total vascular index (Wood Units): 34.39  Outputs:  -- RESISTANCES: Total vascular resistance (dsc): 1535.76  Outputs:  -- RESISTANCES: Total vascular resistance (Wood Units): 19.20  Outputs:  -- RESISTANCES: Total vascular resistance index (dsc): 2750.39  Outputs:  -- RESISTANCES: TPR_TVR Ratio: 0.32  Outputs:  -- SHUNTS: Pulmonary flow: 4.74  Outputs:  -- SHUNTS: Qp Indexed: 2.65  Outputs:  -- SHUNTS: Qs Indexed: 2.65  Outputs:  -- SHUNTS: Systemic flow: 4.74  Outputs:  NO PHASE  Outputs:  -- CALCULATIONS[age]  Outputs:  -- CALCULATIONS: Body Surface Area: 1.79  Outputs:  -- CALCULATIONS: Height in cm: 170.00  Outputs:  -- CALCULATIONS: Sex: Female  Outputs:  -- CALCULATIONS: Weight in k.30  Outputs:  -- OUTPUTS: O2 consumption: 223.86  Outputs:  -- OUTPUTS: Vo2 Indexed: 125.00    < end of copied text >      CODE: FULL

## 2020-05-14 NOTE — PROGRESS NOTE ADULT - ASSESSMENT
70 year old female with HFrEF, dilated cardiomyopathy (EF 25%) c/b severe MR, hypertension, diabetes, presenting with chest pain , SOB and lightheadedness. Symptoms likely in the setting of worsening cardiomyopathy/ HF. Low evidence of support for ACS at this time.
Patient is a 70 year old woman with recently diagnosed HFrEF, dilated cardiomyopathy (EF 25%) with severe functional MR, hypertension, DM2, presenting with progressively worsening chest pain ,SOB, and lightheadedness.     Will arrange for admission to Medicine.  Start IV Lasix 20 BID, monitor on telemetry, and arrange for LHC/RHC today       Problem/Plan - 1:  ·  Problem: Chest pain.  Plan: -Associated with intermittent lightheadedness and SUBRAMANIAN  -Likely in the setting of acute on chronic systolic CHF.  Has not had an ischemic evaluation, will arrange for LHC/RHC today  -c/w DAPT and atorvastatin   -COVID testing negative     Problem/Plan - 2:  ·  Problem: CHF (congestive heart failure).  Plan: HFrEF with EF of 25%  -patient with Bibasilar crackles. BNP 5935 decreased from previous admission   -c/w carvedilol, losartan   -Continue Lasix 20 IV BID, strict I/Os, daily standing wts, fluid restriction  -TTE as above.      Problem/Plan - 3:  ·  Problem: Dilated cardiomyopathy.  Plan: -Dilated cardiomyopathy, unclear cause. Complicated by severe MR  -c/w carvedilol 3.125 BID (Increase as tolerated)  -c/w Losartan 50mg for afterload reduction   -c/w DAPT   -F/U TTE     Problem/Plan - 4:  ·  Problem: DM (diabetes mellitus).  Plan: Last A1C 6.3, pt on metformin at home   -ISS, FS.      Problem/Plan - 5:  ·  Problem: HTN (hypertension).  Plan: c/w home meds.      Problem/Plan - 6:  ·  Problem: Prophylactic measure.  Plan: DVT: Lovenox  Diet: DASH/TLC  COVID: Follow up COVID PCR
Patient is a 70 year old woman with recently diagnosed HFrEF, dilated cardiomyopathy (EF 25%) with severe functional MR, hypertension, DM2, presenting with progressively worsening chest pain ,SOB, and lightheadedness.     Will arrange for admission to Medicine.  Start IV Lasix 20 BID, monitor on telemetry, and arrange for LHC/RHC today       Problem/Plan - 1:  ·  Problem: Chest pain.  Plan: -Associated with intermittent lightheadedness and SUBRAMANIAN  -Likely in the setting of acute on chronic systolic CHF.  No obstructive CAD noted on LHC. LVEDP 8 mmHg  --Can discharge on low dose aspirin and stop Plavix. Continue atorvastatin      Problem/Plan - 2:  ·  Problem: CHF (congestive heart failure).  Plan: HFrEF with EF of 25%  -patient with Bibasilar crackles. BNP 5935 decreased from previous admission   -c/w carvedilol, losartan   -Discharge on Lasix 20 mg daily.  Advised to continue maintaining strict I/Os, daily standing wts, fluid restriction at home  -TTE to reassess MR severity now that BPs are better controlled, improved volume status     Problem/Plan - 3:  ·  Problem: Dilated cardiomyopathy.  Plan: -Dilated cardiomyopathy, unclear cause. Complicated by severe MR  -c/w carvedilol 3.125 BID (Increase as tolerated)  -c/w Losartan 50mg for afterload reduction   -Standing Lasix 20 mg daily     Problem/Plan - 4:  ·  Problem: DM (diabetes mellitus).  Plan: Last A1C 6.3, pt on metformin at home   -ISS, FS.      Problem/Plan - 5:  ·  Problem: HTN (hypertension).  Plan: As above     Problem/Plan - 6:  ·  Problem: Prophylactic measure.  Plan: DVT: Lovenox  Plan for discharge after echocardiogram  F/U with me via TeleHealth in 1 week ([phone])
70 year old female with HFrEF, dilated cardiomyopathy (EF 25%) c/b severe MR, hypertension, diabetes, presenting with chest pain , SOB and lightheadedness. Symptoms likely in the setting of worsening cardiomyopathy/ HF. Low evidence of support for ACS at this time.

## 2020-05-14 NOTE — DISCHARGE NOTE PROVIDER - PROVIDER TOKENS
PROVIDER:[TOKEN:[[id number]],FOLLOWUP:[1 week]],FREE:[LAST:[primary care provider],PHONE:[(   )    -],FAX:[(   )    -],FOLLOWUP:[1 week]]

## 2020-05-14 NOTE — DISCHARGE NOTE PROVIDER - CARE PROVIDER_API CALL
Ambrocio Sommer  CARDIOLOGY  [address] [address] [place] [address]  Phone: [phone]  Fax: [phone]  Follow Up Time: 1 week    primary care provider,   Phone: (   )    -  Fax: (   )    -  Follow Up Time: 1 week

## 2020-05-14 NOTE — DISCHARGE NOTE NURSING/CASE MANAGEMENT/SOCIAL WORK - PATIENT PORTAL LINK FT
You can access the FollowMyHealth Patient Portal offered by [facility] by registering at the following website: [email or web address]. By joining [facility]’s FollowMyHealth portal, you will also be able to view your health information using other applications (apps) compatible with our system.

## 2020-05-14 NOTE — PROGRESS NOTE ADULT - PROBLEM SELECTOR PLAN 1
-Associated with intermittent lightheadedness and SUBRAMANIAN  -Likely in the setting of worsening cardiomyopathy and heart failure. Low suspicion for ACS as patient with Trop of 10 and EKG without evidence of ischemic change (LBBB not new).   -There is a possibility of demand-supply mismatch that is exacerbated by exertion given last TTE in 4/20 with EF of 25%. Repeat TTE to assess for interim changes  -Lipids last admission wnl, A1C 6.3   -s/p cardiac Cath on 5/13 per Dr. Sommer , clean coronaries, appreciate cards recs  -c/w d/c plavix, continue aspirin and atorvastatin  stable for d.c home  d/c time coordinating care
-Associated with intermittent lightheadedness and SUBRAMANIAN  -Likely in the setting of worsening cardiomyopathy and heart failure. Low suspicion for ACS as patient with Trop of 10 and EKG without evidence of ischemic change (LBBB not new).   -There is a possibility of demand-supply mismatch that is exacerbated by exertion given last TTE in 4/20 with EF of 25%. Repeat TTE to assess for interim changes  -Lipids last admission wnl, A1C 6.3   -Plan for Cath on 5/13 per Dr. Sommer given improvement in respiratory status    -c/w DAPT and atorvastatin   -cardiology consulted, recs pending  -CXR concerning for possible atypical PNA/viral, moderate suspicion for COVID given SOB and lymphopenia

## 2020-05-14 NOTE — PROGRESS NOTE ADULT - PROBLEM SELECTOR PLAN 3
-Dilated cardiomyopathy, unclear cause. Complicated by severe MR  -c/w carvedilol 3.125 BID  -c/w Losartan 50mg for afterload reduction   -c/w aspirin, d/c plavix  d/c on lasix 20 daily
-Dilated cardiomyopathy, unclear cause. Complicated by severe MR  -c/w carvedilol 3.125 BID (? Increase as tolerated)  -c/w Losartan 50mg for afterload reduction   -c/w DAPT   -F/U TTE  -F/U cardiology recs

## 2020-05-14 NOTE — DISCHARGE NOTE PROVIDER - NSDCMRMEDTOKEN_GEN_ALL_CORE_FT
Aspirin Enteric Coated 81 mg oral delayed release tablet: 1 tab(s) orally once a day  atorvastatin 40 mg oral tablet: 1 tab(s) orally once a day (at bedtime)  carvedilol 3.125 mg oral tablet: 1 tab(s) orally every 12 hours  clopidogrel 75 mg oral tablet: 1 tab(s) orally once a day  losartan 50 mg oral tablet: 1 tab(s) orally once a day  metFORMIN 500 mg oral tablet: 1 tab(s) orally 2 times a day Aspirin Enteric Coated 81 mg oral delayed release tablet: 1 tab(s) orally once a day  atorvastatin 40 mg oral tablet: 1 tab(s) orally once a day (at bedtime)  carvedilol 3.125 mg oral tablet: 1 tab(s) orally every 12 hours  furosemide 20 mg oral tablet: 1 tab(s) orally once a day MDD:1  losartan 50 mg oral tablet: 1 tab(s) orally once a day  metFORMIN 500 mg oral tablet: 1 tab(s) orally 2 times a day

## 2020-05-14 NOTE — PROGRESS NOTE ADULT - SUBJECTIVE AND OBJECTIVE BOX
Cardiology/Vascular Medicine Inpatient Progress Note      Feels better after IV diuresis  [facility] noted no significant occlusive CAD  LVEDP 8  She should be well compensated from the CHF perspective at this time  Will arrange for reassessment of MR severity at this time since her BPs and volume status now better controlled    No interval events noted on telemetry  COVID PCR test negative 5/12/20    Vital Signs Last 24 Hrs  T(C): 36.9 (14 May 2020 05:20), Max: 37.1 (13 May 2020 09:08)  T(F): 98.5 (14 May 2020 05:20), Max: 98.7 (13 May 2020 09:08)  HR: 65 (14 May 2020 05:20) (55 - 65)  BP: 123/62 (14 May 2020 05:20) (123/62 - 144/91)  BP(mean): --  RR: 18 (14 May 2020 05:20) (18 - 18)  SpO2: 100% (14 May 2020 05:20) (100% - 100%)    I&O's Detail    13 May 2020 07:01  -  14 May 2020 07:00  --------------------------------------------------------  IN:    Oral Fluid: 240 mL  Total IN: 240 mL    OUT:    Voided: 1150 mL  Total OUT: 1150 mL    Total NET: -910 mL      Appearance: NAD, laying flat in bed  HEENT:  no JVD noted  Cardiovascular: Normal S1 S2, 3/6 SM  Respiratory: Decreased breath sounds bilateral bases  Psychiatry: Awake, alert  Extremities: No edema    MEDICATIONS  (STANDING):  aspirin enteric coated 81 milliGRAM(s) Oral daily  atorvastatin 40 milliGRAM(s) Oral at bedtime  carvedilol 3.125 milliGRAM(s) Oral every 12 hours  clopidogrel Tablet 75 milliGRAM(s) Oral daily  dextrose 5%. 1000 milliLiter(s) (50 mL/Hr) IV Continuous <Continuous>  dextrose 50% Injectable 12.5 Gram(s) IV Push once  dextrose 50% Injectable 25 Gram(s) IV Push once  dextrose 50% Injectable 25 Gram(s) IV Push once  enoxaparin Injectable 40 milliGRAM(s) SubCutaneous daily  insulin lispro (HumaLOG) corrective regimen sliding scale   SubCutaneous three times a day before meals  losartan 50 milliGRAM(s) Oral daily    MEDICATIONS  (PRN):  dextrose 40% Gel 15 Gram(s) Oral once PRN Blood Glucose LESS THAN 70 milliGRAM(s)/deciliter  glucagon  Injectable 1 milliGRAM(s) IntraMuscular once PRN Glucose LESS THAN 70 milligrams/deciliter    LABS:	 	                                   12.6   5.79  )-----------( 228      ( 13 May 2020 06:33 )             40.6   05-13    142  |  103  |  13  ----------------------------<  117<H>  3.3<L>   |  27  |  0.75    Ca    9.4      13 May 2020 06:33  Phos  4.3     05-13  Mg     2.1     05-13    TPro  6.6  /  Alb  3.5  /  TBili  0.8  /  DBili  x   /  AST  51<H>  /  ALT  67<H>  /  AlkPhos  105  05-13    PT/INR - ( 13 May 2020 06:33 )   PT: 13.0 SEC;   INR: 1.13     PTT - ( 13 May 2020 06:33 )  PTT:30.5 SEC             proBNP: Serum Pro-Brain Natriuretic Peptide: 5935 pg/mL (05-12-20 @ 13:53)    < from: Xray Chest 1 View-PORTABLE IMMEDIATE (05.12.20 @ 14:03) >    EXAM:  XR CHEST PORTABLE IMMED 1V        PROCEDURE DATE:  May 12 2020         INTERPRETATION:  CLINICAL INDICATION: dyspnea    EXAM:  Single frontal chest from 5/12/2020 at 1403. Compared to prior study from 4/19/2020.    IMPRESSION:  Ill-defined increased markings and hazy opacity in right lower lung suspicious for infectious process including from atypical viral etiologies, follow-up suggested. Hazy indistinct right CP angle compatible with small right pleural effusion. No gross left pleural effusion. Clear remaining visualized lungs. No pneumothorax.    Stable cardiomegaly.    Trachea midline.    Unremarkable osseous structures.    DISCRETE X-RAY DATA:  Percent of LEFT lung opacification: Clear  Percent of RIGHT lung opacification: 1-33%  Change in lung opacification from most recent x-ray (<=3 days): No Prior  Change from prior dated 3 or more days (same admission): Increase      WINTER KADEEM M.D., ATTENDING RADIOLOGIST  This document has been electronically signed. May 12 2020  2:14PM          < from: Transthoracic Echocardiogram (04.19.20 @ 08:36) >    Patient name: LALY MAKI  YOB: 1950   Age: 70 (F)   MR#: [id number]  Study Date: 4/19/2020  Location: O/PSonographer: Soledad [facility]  Study quality: Technically good  Referring Physician: Not Available Doctor, MD  Blood Pressure: 122/75 mmHg  Height: 165 cm  Weight: 55 kg  BSA: 1.6 m2  Heart Rhythm: Normal sinus  Heart Rate: 64 mmHg  ------------------------------------------------------------------------  PROCEDURE: Transthoracic echocardiogram with 2-D, M-Mode  and complete spectral and color flow Doppler.  INDICATION: Chest pain, unspecified (R07.9)  ------------------------------------------------------------------------  DIMENSIONS:  Dimensions:     Normal Values:  LA:     4.4 cm    2.0 - 4.0 cm  Ao:     3.8 cm    2.0 - 3.8 cm  SEPTUM: 0.9 cm    0.6 - 1.2 cm  PWT:    0.8 cm    0.6 - 1.1 cm  LVIDd:  6.6 cm    3.0 - 5.6 cm  LVIDs:  6.2 cm    1.8 - 4.0 cm  Derived Variables:  LVMI: 147 g/m2  RWT: 0.24  Ejection Fraction (Visual Estimate): 25 %  Peak Velocity (m/sec): TV=3.0  ------------------------------------------------------------------------  OBSERVATIONS:  Mitral Valve: Normal appearing mitral valve leaflets.    Severe functional mitral regurgitation.  Aortic Root: Normal size aortic root.  Aortic Valve: Normal aortic valve. Mild aortic  regurgitation.  Left Atrium: Severely dilated left atrium.  Left Ventricle: Severe left ventricular enlargement.  Severely dilated left ventricle. Diffuse hypokinesis.  Severe reversible diastolic dysfunction (Stage III).  Right Heart: Normal right atrium. Normal right ventricular  size and function. Normal tricuspid valve.  Mild tricuspid  regurgitation. Normal pulmonic valve. Mild pulmonic  regurgitation.  Pericardium/PleuraNormal pericardium with trace pericardial  effusion.  Hemodynamic: Estimated right atrial pressure is mildly  elevated.  Mild pulmonary hypertension. Estimated PASP 44 mmHg.  No PFO seen with color Doppler.  ------------------------------------------------------------------------  CONCLUSIONS:  Severely dilated left ventricle. Diffuse hypokinesis.  Severe functional mitral regurgitation.  Mild pulmonary hypertension.  ------------------------------------------------------------------------  Confirmed on  4/19/2020 - 10:32:55 by Dontae Worley M.D.  ------------------------------------------------------------------------    < end of copied text >

## 2020-05-14 NOTE — DISCHARGE NOTE PROVIDER - NSDCCPCAREPLAN_GEN_ALL_CORE_FT
PRINCIPAL DISCHARGE DIAGNOSIS  Diagnosis: HFrEF (heart failure with reduced ejection fraction)  Assessment and Plan of Treatment: EF 25%.    It is important you follow a strict DASH diet which includes low sodium.  Following a low salt diet helps to decrease hypertension and extra fluid which causes edema (swelling) and shortness of breath.   You should have no more than 2000 mg of sodium daily and ideally a lower sodium at 1500 mg per day is best.    It is is also important you take prescribed medications which include lasix, which is a diuretic (water pill).  This will help to get rid of excess water.  It is best to take it at a time during the day where you will be awake after taking so you can go to the bathroom as this medication will increase the frequency of urination after taking.    It is also important that you keep a daily log of your weight which will help with fluid management.    Please follow up within one week of discharge from the hospital with Dr. Sommer, Cardiology for a telehealth visit.  Dr. Sommer has seen you in the hospital and has participated in the management of your care.  His phone number is:  [phone].

## 2020-05-15 PROBLEM — I50.9 HEART FAILURE, UNSPECIFIED: Chronic | Status: ACTIVE | Noted: 2020-05-12

## 2020-05-15 PROBLEM — I42.0 DILATED CARDIOMYOPATHY: Chronic | Status: ACTIVE | Noted: 2020-05-12

## 2020-05-21 ENCOUNTER — APPOINTMENT (OUTPATIENT)
Dept: CARDIOLOGY | Facility: CLINIC | Age: 70
End: 2020-05-21
Payer: SELF-PAY

## 2020-05-21 VITALS — DIASTOLIC BLOOD PRESSURE: 72 MMHG | SYSTOLIC BLOOD PRESSURE: 118 MMHG | HEART RATE: 64 BPM

## 2020-05-21 VITALS — HEIGHT: 67 IN

## 2020-05-21 DIAGNOSIS — J81.1 CHRONIC PULMONARY EDEMA: ICD-10-CM

## 2020-05-21 PROCEDURE — 99214 OFFICE O/P EST MOD 30 MIN: CPT | Mod: 95

## 2020-05-21 RX ORDER — CLOPIDOGREL BISULFATE 75 MG/1
75 TABLET, FILM COATED ORAL DAILY
Qty: 90 | Refills: 3 | Status: DISCONTINUED | COMMUNITY
Start: 2020-04-21 | End: 2020-05-21

## 2020-05-22 PROBLEM — J81.1 PULMONARY EDEMA: Status: ACTIVE | Noted: 2020-05-05

## 2020-05-22 NOTE — REVIEW OF SYSTEMS
[Dyspnea on exertion] : not dyspnea during exertion [Shortness Of Breath] : no shortness of breath [Chest  Pressure] : no chest pressure [Negative] : Psychiatric

## 2020-05-22 NOTE — REASON FOR VISIT
[FreeTextEntry1] : Consent obtained. AW Touchpoint not working. [employer] telemedicine platform used.\par \par Ms Godoy was last evaluated via Telemedicine on 5/12/20.  She reports feeling much better, now that she is on a standing diuretic regimen after recent hospitalization.  She had undergone LHC which revealed no obstructive CAD.  Further, because she had already been diuresed, her LVEDP was 8.  Follow-up echocardiogram prior to discharge noted moderate MR.\par \par She has remained compliant with her medications. \par \par F/U in 2-4 weeks.\par \par Of noted, she was diagnosed with ADHF/NICM, as this was a new finding of cardiomyopathy, severe functional MR on her recent [facility] admission.  She was medically managed as the hospital was under lockdown due to the ongoing COVID pandemic. \par \par Hospital Course: \par Discharge Date	21-Apr-2020 \par Admission Date	19-Apr-2020 00:59 \par Reason for Admission	Left sided chest pain, SOB and palpitations \par Hospital Course	 \par HPI: \par 71 y/o F with PMH of DM type II, HTN presents to ED with the compliant of left \par sided chest pain, palpitations, SUBRAMANIAN since Wednesday. As per the patient she was \par in her usual state of health and developed left sided chest pain gradually. \par Patient described the chest pain as a heaviness sensation, intermittent, \par aggravated with exertion and relief with rest, with radiation of the chest pain \par to the left shoulder, with a severity of 5/10. Patient also endorsed of SOB and \par SUBRAMANIAN that has been chronic for her. Patient stated that she has chronic Hx of \par bilateral LE swelling and endorsed of 3 pillow orthopnea. Patient denied any \par prior cardiac workup with NST or TTE. Patient denied any fevers, chills, \par N/V/D/C, abdominal pain, dysuria, melena, hematochezia, recent travel, sick \par contact, pleuritic or positional chest pain. \par \par On ED admission EKG revealed NSR at a rate of 92 with possible LAE, LBBB with \par QTc of 504, CE x 1: Trop: 18, Na: 133, K: 5.6->severely hemolyzed, Gluc: 169, \par AST: 150, ALT: 110. Prelim CXR: There is blunting of the perihilar vessels \par concerning for mild pulmonary edema. Small left pleural effusion. In the ED \par patient was given Aspirin 325mg. When examined patient is resting in the \par stretcher and denied any current chest pain or SOB. \par \par Hospital Course: \par Admitted for presumed NSTEMI. Troponins continued to increase, loaded with ASA \par in the ED and Plavix on the floor. Held off on heparin gtt as per cards given \par that pt was not having any chest pain and troponins were mildly elevated. TTE \par showed EF 25%, severely dilated LV. Cardiology recommending medical management \par with coreg, losartan, DAPT, atorvastatin. Patient made aware to follow up with \par cards within a week, [phone], within 1 week post discharge followup via \par telehealth. \par \par \par Med Reconciliation: \par Medication Reconciliation Status	Admission Reconciliation is Completed \par Discharge Reconciliation is Completed \par \par Discharge Medications	Aspirin Enteric Coated 81 mg oral delayed release tablet: \par 1 tab(s) orally once a day \par atorvastatin 40 mg oral tablet: 1 tab(s) orally once a day (at bedtime) \par metFORMIN 500 mg oral tablet: 1 tab(s) orally 2 times a day \par \par \par Care Plan/Procedures: \par Goal(s)	To get better and follow your care plan as instructed. \par Discharge Diagnoses, Assessment and Plan of Treatment	PRINCIPAL DISCHARGE \par DIAGNOSIS \par Diagnosis: Chest pain \par Assessment and Plan of Treatment: You were having chest pain due to a lack of \par blood flow to the heart muscle, as indicated by the laboratory tests. You had \par an echocardiogram which showed that the heart muscle is severely weakened. You \par were treated with aspirin and clopidogrel (Plavix). Please continue to take \par your medications as prescribed and follow up with your cardiologist and your \par primary care doctor. Please follow up within a week, with cardiology, please \par call [phone] to schedule an appointment \par \par

## 2020-06-25 ENCOUNTER — APPOINTMENT (OUTPATIENT)
Dept: CARDIOLOGY | Facility: CLINIC | Age: 70
End: 2020-06-25
Payer: COMMERCIAL

## 2020-06-25 VITALS
DIASTOLIC BLOOD PRESSURE: 86 MMHG | WEIGHT: 143 LBS | HEART RATE: 64 BPM | BODY MASS INDEX: 22.44 KG/M2 | TEMPERATURE: 96.1 F | OXYGEN SATURATION: 98 % | HEIGHT: 67 IN | SYSTOLIC BLOOD PRESSURE: 146 MMHG

## 2020-06-25 DIAGNOSIS — R06.02 SHORTNESS OF BREATH: ICD-10-CM

## 2020-06-25 PROCEDURE — 93000 ELECTROCARDIOGRAM COMPLETE: CPT

## 2020-06-25 PROCEDURE — 99214 OFFICE O/P EST MOD 30 MIN: CPT

## 2020-06-28 ENCOUNTER — NON-APPOINTMENT (OUTPATIENT)
Age: 70
End: 2020-06-28

## 2020-06-28 PROBLEM — R06.02 SOB (SHORTNESS OF BREATH) ON EXERTION: Status: ACTIVE | Noted: 2020-05-05

## 2020-09-16 ENCOUNTER — APPOINTMENT (OUTPATIENT)
Dept: CARDIOLOGY | Facility: CLINIC | Age: 70
End: 2020-09-16
Payer: MEDICARE

## 2020-09-16 ENCOUNTER — NON-APPOINTMENT (OUTPATIENT)
Age: 70
End: 2020-09-16

## 2020-09-16 VITALS
BODY MASS INDEX: 22.24 KG/M2 | OXYGEN SATURATION: 98 % | TEMPERATURE: 97.7 F | DIASTOLIC BLOOD PRESSURE: 78 MMHG | SYSTOLIC BLOOD PRESSURE: 121 MMHG | HEIGHT: 67 IN | HEART RATE: 63 BPM

## 2020-09-16 VITALS — BODY MASS INDEX: 22.24 KG/M2 | WEIGHT: 142 LBS

## 2020-09-16 DIAGNOSIS — Z87.898 PERSONAL HISTORY OF OTHER SPECIFIED CONDITIONS: ICD-10-CM

## 2020-09-16 DIAGNOSIS — I34.0 NONRHEUMATIC MITRAL (VALVE) INSUFFICIENCY: ICD-10-CM

## 2020-09-16 DIAGNOSIS — R00.2 PALPITATIONS: ICD-10-CM

## 2020-09-16 PROCEDURE — 93000 ELECTROCARDIOGRAM COMPLETE: CPT

## 2020-09-16 PROCEDURE — 99214 OFFICE O/P EST MOD 30 MIN: CPT

## 2020-09-17 LAB
ALBUMIN SERPL ELPH-MCNC: 4.3 G/DL
ALP BLD-CCNC: 116 U/L
ALT SERPL-CCNC: 24 U/L
ANION GAP SERPL CALC-SCNC: 13 MMOL/L
AST SERPL-CCNC: 31 U/L
BASOPHILS # BLD AUTO: 0.05 K/UL
BASOPHILS NFR BLD AUTO: 1.1 %
BILIRUB SERPL-MCNC: 0.6 MG/DL
BUN SERPL-MCNC: 14 MG/DL
CALCIUM SERPL-MCNC: 9.5 MG/DL
CHLORIDE SERPL-SCNC: 103 MMOL/L
CHOLEST SERPL-MCNC: 114 MG/DL
CHOLEST/HDLC SERPL: 1.7 RATIO
CO2 SERPL-SCNC: 26 MMOL/L
CREAT SERPL-MCNC: 0.82 MG/DL
EOSINOPHIL # BLD AUTO: 0.13 K/UL
EOSINOPHIL NFR BLD AUTO: 2.8 %
ESTIMATED AVERAGE GLUCOSE: 134 MG/DL
GLUCOSE SERPL-MCNC: 127 MG/DL
HBA1C MFR BLD HPLC: 6.3 %
HCT VFR BLD CALC: 39.7 %
HDLC SERPL-MCNC: 66 MG/DL
HGB BLD-MCNC: 12.2 G/DL
IMM GRANULOCYTES NFR BLD AUTO: 0 %
LDLC SERPL CALC-MCNC: 36 MG/DL
LYMPHOCYTES # BLD AUTO: 1.6 K/UL
LYMPHOCYTES NFR BLD AUTO: 35 %
MAN DIFF?: NORMAL
MCHC RBC-ENTMCNC: 27.3 PG
MCHC RBC-ENTMCNC: 30.7 GM/DL
MCV RBC AUTO: 88.8 FL
MONOCYTES # BLD AUTO: 0.54 K/UL
MONOCYTES NFR BLD AUTO: 11.8 %
NEUTROPHILS # BLD AUTO: 2.25 K/UL
NEUTROPHILS NFR BLD AUTO: 49.3 %
NT-PROBNP SERPL-MCNC: 3442 PG/ML
PLATELET # BLD AUTO: 235 K/UL
POTASSIUM SERPL-SCNC: 4.2 MMOL/L
PROT SERPL-MCNC: 7.1 G/DL
RBC # BLD: 4.47 M/UL
RBC # FLD: 15 %
SODIUM SERPL-SCNC: 141 MMOL/L
TRIGL SERPL-MCNC: 60 MG/DL
TSH SERPL-ACNC: 2.29 UIU/ML
WBC # FLD AUTO: 4.57 K/UL

## 2020-10-20 ENCOUNTER — INPATIENT (INPATIENT)
Facility: HOSPITAL | Age: 70
LOS: 2 days | Discharge: HOME | End: 2020-10-23
Attending: HOSPITALIST | Admitting: HOSPITALIST
Payer: MEDICARE

## 2020-10-20 VITALS
RESPIRATION RATE: 16 BRPM | TEMPERATURE: 98 F | HEIGHT: 67 IN | OXYGEN SATURATION: 100 % | HEART RATE: 74 BPM | DIASTOLIC BLOOD PRESSURE: 85 MMHG | SYSTOLIC BLOOD PRESSURE: 147 MMHG

## 2020-10-20 LAB
ALBUMIN SERPL ELPH-MCNC: 4.1 G/DL — SIGNIFICANT CHANGE UP (ref 3.3–5)
ALP SERPL-CCNC: 116 U/L — SIGNIFICANT CHANGE UP (ref 40–120)
ALT FLD-CCNC: 31 U/L — SIGNIFICANT CHANGE UP (ref 4–33)
ANION GAP SERPL CALC-SCNC: 11 MMO/L — SIGNIFICANT CHANGE UP (ref 7–14)
AST SERPL-CCNC: 43 U/L — HIGH (ref 4–32)
BASOPHILS # BLD AUTO: 0.06 K/UL — SIGNIFICANT CHANGE UP (ref 0–0.2)
BASOPHILS NFR BLD AUTO: 1 % — SIGNIFICANT CHANGE UP (ref 0–2)
BILIRUB SERPL-MCNC: 0.8 MG/DL — SIGNIFICANT CHANGE UP (ref 0.2–1.2)
BUN SERPL-MCNC: 11 MG/DL — SIGNIFICANT CHANGE UP (ref 7–23)
CALCIUM SERPL-MCNC: 8.9 MG/DL — SIGNIFICANT CHANGE UP (ref 8.4–10.5)
CHLORIDE SERPL-SCNC: 108 MMOL/L — HIGH (ref 98–107)
CO2 SERPL-SCNC: 25 MMOL/L — SIGNIFICANT CHANGE UP (ref 22–31)
CREAT SERPL-MCNC: 0.74 MG/DL — SIGNIFICANT CHANGE UP (ref 0.5–1.3)
EOSINOPHIL # BLD AUTO: 0.09 K/UL — SIGNIFICANT CHANGE UP (ref 0–0.5)
EOSINOPHIL NFR BLD AUTO: 1.5 % — SIGNIFICANT CHANGE UP (ref 0–6)
GLUCOSE SERPL-MCNC: 130 MG/DL — HIGH (ref 70–99)
HCT VFR BLD CALC: 38.9 % — SIGNIFICANT CHANGE UP (ref 34.5–45)
HGB BLD-MCNC: 11.7 G/DL — SIGNIFICANT CHANGE UP (ref 11.5–15.5)
IMM GRANULOCYTES NFR BLD AUTO: 0.2 % — SIGNIFICANT CHANGE UP (ref 0–1.5)
LYMPHOCYTES # BLD AUTO: 2.5 K/UL — SIGNIFICANT CHANGE UP (ref 1–3.3)
LYMPHOCYTES # BLD AUTO: 41.5 % — SIGNIFICANT CHANGE UP (ref 13–44)
MCHC RBC-ENTMCNC: 25.5 PG — LOW (ref 27–34)
MCHC RBC-ENTMCNC: 30.1 % — LOW (ref 32–36)
MCV RBC AUTO: 84.9 FL — SIGNIFICANT CHANGE UP (ref 80–100)
MONOCYTES # BLD AUTO: 0.76 K/UL — SIGNIFICANT CHANGE UP (ref 0–0.9)
MONOCYTES NFR BLD AUTO: 12.6 % — SIGNIFICANT CHANGE UP (ref 2–14)
NEUTROPHILS # BLD AUTO: 2.61 K/UL — SIGNIFICANT CHANGE UP (ref 1.8–7.4)
NEUTROPHILS NFR BLD AUTO: 43.2 % — SIGNIFICANT CHANGE UP (ref 43–77)
NRBC # FLD: 0 K/UL — SIGNIFICANT CHANGE UP (ref 0–0)
NT-PROBNP SERPL-SCNC: 8155 PG/ML — SIGNIFICANT CHANGE UP
PLATELET # BLD AUTO: 241 K/UL — SIGNIFICANT CHANGE UP (ref 150–400)
PMV BLD: 12.5 FL — SIGNIFICANT CHANGE UP (ref 7–13)
POTASSIUM SERPL-MCNC: 3.8 MMOL/L — SIGNIFICANT CHANGE UP (ref 3.5–5.3)
POTASSIUM SERPL-SCNC: 3.8 MMOL/L — SIGNIFICANT CHANGE UP (ref 3.5–5.3)
PROT SERPL-MCNC: 7.1 G/DL — SIGNIFICANT CHANGE UP (ref 6–8.3)
RBC # BLD: 4.58 M/UL — SIGNIFICANT CHANGE UP (ref 3.8–5.2)
RBC # FLD: 15.5 % — HIGH (ref 10.3–14.5)
SODIUM SERPL-SCNC: 144 MMOL/L — SIGNIFICANT CHANGE UP (ref 135–145)
TROPONIN T, HIGH SENSITIVITY: 11 NG/L — SIGNIFICANT CHANGE UP (ref ?–14)
TROPONIN T, HIGH SENSITIVITY: 13 NG/L — SIGNIFICANT CHANGE UP (ref ?–14)
WBC # BLD: 6.03 K/UL — SIGNIFICANT CHANGE UP (ref 3.8–10.5)
WBC # FLD AUTO: 6.03 K/UL — SIGNIFICANT CHANGE UP (ref 3.8–10.5)

## 2020-10-20 PROCEDURE — 71046 X-RAY EXAM CHEST 2 VIEWS: CPT | Mod: 26

## 2020-10-20 PROCEDURE — 99285 EMERGENCY DEPT VISIT HI MDM: CPT

## 2020-10-20 RX ORDER — ATORVASTATIN CALCIUM 80 MG/1
40 TABLET, FILM COATED ORAL ONCE
Refills: 0 | Status: COMPLETED | OUTPATIENT
Start: 2020-10-20 | End: 2020-10-20

## 2020-10-20 RX ORDER — CARVEDILOL PHOSPHATE 80 MG/1
6.25 CAPSULE, EXTENDED RELEASE ORAL ONCE
Refills: 0 | Status: COMPLETED | OUTPATIENT
Start: 2020-10-20 | End: 2020-10-20

## 2020-10-20 RX ADMIN — CARVEDILOL PHOSPHATE 6.25 MILLIGRAM(S): 80 CAPSULE, EXTENDED RELEASE ORAL at 20:21

## 2020-10-20 RX ADMIN — ATORVASTATIN CALCIUM 40 MILLIGRAM(S): 80 TABLET, FILM COATED ORAL at 20:21

## 2020-10-20 NOTE — ED ADULT TRIAGE NOTE - CHIEF COMPLAINT QUOTE
Pt c/o intermittent chest pain, nausea, dizziness, sob x 3 weeks, worse over the past few days.  Denies cough,

## 2020-10-20 NOTE — ED ADULT NURSE NOTE - OBJECTIVE STATEMENT
Pt received in room 15. Pt A&Ox4, c/o chest pain that radiates to her left arm for the past 3 weeks, SOB for the last 2 weeks, dizziness when standing and nausea. Pt denies any headache, vomiting or diarrhea. Pt is a poor historian. Pt's skin warm and dry. Pt's respirations even and unlabored, in normal sinus rhythm on cardiac monitor. Will continue to monitor Pt received in room 15. Pt A&Ox4 with a pmhx of HTN, DM and heart failure, c/o chest pain that radiates to her left arm for the past 3 weeks and SOB for the last 2 weeks. Pt states she feels dizziness when standing. Pt states she has had nausea and denies any headache, vomiting or diarrhea. Pt's skin warm and dry. Pt's respirations even and unlabored, in normal sinus rhythm on cardiac monitor. Will continue to monitor

## 2020-10-20 NOTE — ED ADULT NURSE NOTE - NSIMPLEMENTINTERV_GEN_ALL_ED
Implemented All Fall Risk Interventions:  [place] to call system. Call bell, personal items and telephone within reach. Instruct patient to call for assistance. Room bathroom lighting operational. Non-slip footwear when patient is off stretcher. Physically safe environment: no spills, clutter or unnecessary equipment. Stretcher in lowest position, wheels locked, appropriate side rails in place. Provide visual cue, wrist band, yellow gown, etc. Monitor gait and stability. Monitor for mental status changes and reorient to person, place, and time. Review medications for side effects contributing to fall risk. Reinforce activity limits and safety measures with patient and family.

## 2020-10-20 NOTE — ED PROVIDER NOTE - CLINICAL SUMMARY MEDICAL DECISION MAKING FREE TEXT BOX
Patient is a 70 y.o female with PMHx of HF, dilated cardiomyopathy (4/20 EF 25%) c/b severe MR, hypertension, diabetes, presenting with intermittent CP and sob x 3 weeks. Plan- trops, ecg, cxr, bnp, covid, will monitor and reassess.

## 2020-10-20 NOTE — ED PROVIDER NOTE - PROGRESS NOTE DETAILS
PA Pierre- Call was placed to Ambrocio Sommer no answer VM left. Will place patient in CDU for rpt echo and for cards to see in AM. CDU PA accepted patient.

## 2020-10-20 NOTE — ED PROVIDER NOTE - PHYSICAL EXAMINATION
Vital signs reviewed.   CONSTITUTIONAL: Well-appearing; well-nourished; in no apparent distress. Non-toxic appearing.   HEAD: Normocephalic, atraumatic.  EYES: PERRL, EOM intact, conjunctiva and sclera WNL.  ENT: normal nose; no rhinorrhea;   NECK/LYMPH: Supple; non-tender;   CARD: Normal S1, S2; no murmurs, rubs, or gallops noted.  RESP: Normal chest excursion with respiration; breath sounds clear and equal bilaterally; no wheezes, rhonchi, or rales.  ABD/GI: soft, non-distended;  non-tender   EXT/MS: moves all extremities; no midline tenderness. Mild pedal edema Lt>Rt.   SKIN: Normal for age and race;   NEURO: Awake, alert, oriented x 3, no gross deficits  PSYCH: Normal mood; appropriate affect.

## 2020-10-20 NOTE — ED ADULT NURSE REASSESSMENT NOTE - NS ED NURSE REASSESS COMMENT FT1
Received report from AMY Wolff. Pt resting in bed at this time, resp. even and unlabored. Reports chest pain is "feeling better". Denies SOB, HA, and dizziness. VS as noted. NSR on the CM. NAD. Will continue to monitor.

## 2020-10-20 NOTE — ED ADULT TRIAGE NOTE - HEIGHT IN FEET
[FreeTextEntry1] : CPAP forgot chip but wearing daily\par \par 11/18/2019\par Pulmonary function testing\par These data demonstrate a moderate obstructive ventilatory deficit. There is no significant bronchodilator response.\par PFT attached relatively stable function.\par \par 11/18/2019 \par PA and lateral chest radiograph reveals A normal heart and mediastinum with a calcified aortic knob. There is no significant pleural disease. She is kyphotic. Bony structures are intact, no bilateral areas of atelectasis versus scarring predominantly in the lower lobes. There is a retrocardiac area of increased density also predominantly linear.\par \par There is no significant change compared to prior chest radiograph of 10/23/19\par \par Prior ct reviewed. Likely radiographic changes new but difficult to clearly charecterize.\par \par \par  5

## 2020-10-20 NOTE — ED PROVIDER NOTE - OBJECTIVE STATEMENT
HPI- Patient is a 70 y.o female with PMHx of HF, dilated cardiomyopathy (EF 25%) c/b severe MR, hypertension, diabetes, presenting with intermittent CP HPI- Patient is a 70 y.o female with PMHx of HF, dilated cardiomyopathy (4/20 EF 25%) c/b severe MR, hypertension, diabetes, presenting with intermittent CP and sob x 3 weeks. As per patient states that she has had similar sx in past, last was admitted in May for similar sx had Cardiac cath that showed some luminal irregularities but no intervention needed. Pt states her symptoms had improved until 3 weeks ago when she began to have intermittent Lt side chest pain described as pressure a/w sob. Pt states sob slightly exertional, worse with ambulating. Pt states she is compliant with her home meds (losartan, carvedilol, metformin, furosemide, asa, atorvastatin). Pt notes some lightheadedness, states few days ago she went to stand up and felt dizzy, almost fell but daughter caught her. No loc, did not hit her head. Denies Coughing, URI, fevers, chills, LE swelling, calf pain, headaches, or any other complaints.

## 2020-10-21 DIAGNOSIS — I42.0 DILATED CARDIOMYOPATHY: ICD-10-CM

## 2020-10-21 DIAGNOSIS — E11.9 TYPE 2 DIABETES MELLITUS WITHOUT COMPLICATIONS: ICD-10-CM

## 2020-10-21 DIAGNOSIS — Z29.9 ENCOUNTER FOR PROPHYLACTIC MEASURES, UNSPECIFIED: ICD-10-CM

## 2020-10-21 DIAGNOSIS — I10 ESSENTIAL (PRIMARY) HYPERTENSION: ICD-10-CM

## 2020-10-21 DIAGNOSIS — R07.9 CHEST PAIN, UNSPECIFIED: ICD-10-CM

## 2020-10-21 DIAGNOSIS — I50.20 UNSPECIFIED SYSTOLIC (CONGESTIVE) HEART FAILURE: ICD-10-CM

## 2020-10-21 LAB
ANION GAP SERPL CALC-SCNC: 10 MMO/L — SIGNIFICANT CHANGE UP (ref 7–14)
BUN SERPL-MCNC: 16 MG/DL — SIGNIFICANT CHANGE UP (ref 7–23)
CALCIUM SERPL-MCNC: 8.7 MG/DL — SIGNIFICANT CHANGE UP (ref 8.4–10.5)
CHLORIDE SERPL-SCNC: 106 MMOL/L — SIGNIFICANT CHANGE UP (ref 98–107)
CO2 SERPL-SCNC: 25 MMOL/L — SIGNIFICANT CHANGE UP (ref 22–31)
CREAT SERPL-MCNC: 0.83 MG/DL — SIGNIFICANT CHANGE UP (ref 0.5–1.3)
GLUCOSE SERPL-MCNC: 179 MG/DL — HIGH (ref 70–99)
HBA1C BLD-MCNC: 6.8 % — HIGH (ref 4–5.6)
HCT VFR BLD CALC: 38.2 % — SIGNIFICANT CHANGE UP (ref 34.5–45)
HGB BLD-MCNC: 11.4 G/DL — LOW (ref 11.5–15.5)
MAGNESIUM SERPL-MCNC: 2.1 MG/DL — SIGNIFICANT CHANGE UP (ref 1.6–2.6)
MCHC RBC-ENTMCNC: 25.6 PG — LOW (ref 27–34)
MCHC RBC-ENTMCNC: 29.8 % — LOW (ref 32–36)
MCV RBC AUTO: 85.7 FL — SIGNIFICANT CHANGE UP (ref 80–100)
NRBC # FLD: 0 K/UL — SIGNIFICANT CHANGE UP (ref 0–0)
PHOSPHATE SERPL-MCNC: 3.5 MG/DL — SIGNIFICANT CHANGE UP (ref 2.5–4.5)
PLATELET # BLD AUTO: 217 K/UL — SIGNIFICANT CHANGE UP (ref 150–400)
PMV BLD: 11.7 FL — SIGNIFICANT CHANGE UP (ref 7–13)
POTASSIUM SERPL-MCNC: 3.8 MMOL/L — SIGNIFICANT CHANGE UP (ref 3.5–5.3)
POTASSIUM SERPL-SCNC: 3.8 MMOL/L — SIGNIFICANT CHANGE UP (ref 3.5–5.3)
RBC # BLD: 4.46 M/UL — SIGNIFICANT CHANGE UP (ref 3.8–5.2)
RBC # FLD: 15.6 % — HIGH (ref 10.3–14.5)
SARS-COV-2 RNA SPEC QL NAA+PROBE: SIGNIFICANT CHANGE UP
SODIUM SERPL-SCNC: 141 MMOL/L — SIGNIFICANT CHANGE UP (ref 135–145)
WBC # BLD: 5.77 K/UL — SIGNIFICANT CHANGE UP (ref 3.8–10.5)
WBC # FLD AUTO: 5.77 K/UL — SIGNIFICANT CHANGE UP (ref 3.8–10.5)

## 2020-10-21 PROCEDURE — 93306 TTE W/DOPPLER COMPLETE: CPT | Mod: 26

## 2020-10-21 PROCEDURE — 99234 HOSP IP/OBS SM DT SF/LOW 45: CPT

## 2020-10-21 PROCEDURE — 99223 1ST HOSP IP/OBS HIGH 75: CPT

## 2020-10-21 RX ORDER — DEXTROSE 50 % IN WATER 50 %
25 SYRINGE (ML) INTRAVENOUS ONCE
Refills: 0 | Status: DISCONTINUED | OUTPATIENT
Start: 2020-10-21 | End: 2020-10-23

## 2020-10-21 RX ORDER — FUROSEMIDE 40 MG
20 TABLET ORAL
Refills: 0 | Status: DISCONTINUED | OUTPATIENT
Start: 2020-10-21 | End: 2020-10-21

## 2020-10-21 RX ORDER — INSULIN LISPRO 100/ML
VIAL (ML) SUBCUTANEOUS
Refills: 0 | Status: DISCONTINUED | OUTPATIENT
Start: 2020-10-21 | End: 2020-10-23

## 2020-10-21 RX ORDER — ENOXAPARIN SODIUM 100 MG/ML
40 INJECTION SUBCUTANEOUS DAILY
Refills: 0 | Status: DISCONTINUED | OUTPATIENT
Start: 2020-10-21 | End: 2020-10-23

## 2020-10-21 RX ORDER — FUROSEMIDE 40 MG
40 TABLET ORAL DAILY
Refills: 0 | Status: DISCONTINUED | OUTPATIENT
Start: 2020-10-21 | End: 2020-10-21

## 2020-10-21 RX ORDER — FUROSEMIDE 40 MG
20 TABLET ORAL DAILY
Refills: 0 | Status: DISCONTINUED | OUTPATIENT
Start: 2020-10-21 | End: 2020-10-21

## 2020-10-21 RX ORDER — FUROSEMIDE 40 MG
40 TABLET ORAL
Refills: 0 | Status: DISCONTINUED | OUTPATIENT
Start: 2020-10-21 | End: 2020-10-23

## 2020-10-21 RX ORDER — CARVEDILOL PHOSPHATE 80 MG/1
3.12 CAPSULE, EXTENDED RELEASE ORAL EVERY 12 HOURS
Refills: 0 | Status: DISCONTINUED | OUTPATIENT
Start: 2020-10-21 | End: 2020-10-22

## 2020-10-21 RX ORDER — GLUCAGON INJECTION, SOLUTION 0.5 MG/.1ML
1 INJECTION, SOLUTION SUBCUTANEOUS ONCE
Refills: 0 | Status: DISCONTINUED | OUTPATIENT
Start: 2020-10-21 | End: 2020-10-23

## 2020-10-21 RX ORDER — FUROSEMIDE 40 MG
40 TABLET ORAL
Refills: 0 | Status: DISCONTINUED | OUTPATIENT
Start: 2020-10-21 | End: 2020-10-21

## 2020-10-21 RX ORDER — FUROSEMIDE 40 MG
20 TABLET ORAL ONCE
Refills: 0 | Status: DISCONTINUED | OUTPATIENT
Start: 2020-10-21 | End: 2020-10-21

## 2020-10-21 RX ORDER — DEXTROSE 50 % IN WATER 50 %
12.5 SYRINGE (ML) INTRAVENOUS ONCE
Refills: 0 | Status: DISCONTINUED | OUTPATIENT
Start: 2020-10-21 | End: 2020-10-23

## 2020-10-21 RX ORDER — FUROSEMIDE 40 MG
40 TABLET ORAL ONCE
Refills: 0 | Status: COMPLETED | OUTPATIENT
Start: 2020-10-21 | End: 2020-10-21

## 2020-10-21 RX ORDER — METFORMIN HYDROCHLORIDE 850 MG/1
500 TABLET ORAL DAILY
Refills: 0 | Status: DISCONTINUED | OUTPATIENT
Start: 2020-10-21 | End: 2020-10-21

## 2020-10-21 RX ORDER — DEXTROSE 50 % IN WATER 50 %
15 SYRINGE (ML) INTRAVENOUS ONCE
Refills: 0 | Status: DISCONTINUED | OUTPATIENT
Start: 2020-10-21 | End: 2020-10-23

## 2020-10-21 RX ORDER — SODIUM CHLORIDE 9 MG/ML
1000 INJECTION, SOLUTION INTRAVENOUS
Refills: 0 | Status: DISCONTINUED | OUTPATIENT
Start: 2020-10-21 | End: 2020-10-23

## 2020-10-21 RX ORDER — LOSARTAN POTASSIUM 100 MG/1
50 TABLET, FILM COATED ORAL DAILY
Refills: 0 | Status: DISCONTINUED | OUTPATIENT
Start: 2020-10-21 | End: 2020-10-22

## 2020-10-21 RX ORDER — INSULIN LISPRO 100/ML
VIAL (ML) SUBCUTANEOUS AT BEDTIME
Refills: 0 | Status: DISCONTINUED | OUTPATIENT
Start: 2020-10-21 | End: 2020-10-23

## 2020-10-21 RX ORDER — ATORVASTATIN CALCIUM 80 MG/1
40 TABLET, FILM COATED ORAL AT BEDTIME
Refills: 0 | Status: DISCONTINUED | OUTPATIENT
Start: 2020-10-21 | End: 2020-10-23

## 2020-10-21 RX ORDER — ASPIRIN/CALCIUM CARB/MAGNESIUM 324 MG
81 TABLET ORAL DAILY
Refills: 0 | Status: DISCONTINUED | OUTPATIENT
Start: 2020-10-21 | End: 2020-10-23

## 2020-10-21 RX ADMIN — CARVEDILOL PHOSPHATE 3.12 MILLIGRAM(S): 80 CAPSULE, EXTENDED RELEASE ORAL at 18:07

## 2020-10-21 RX ADMIN — Medication 40 MILLIGRAM(S): at 14:26

## 2020-10-21 RX ADMIN — Medication 81 MILLIGRAM(S): at 14:26

## 2020-10-21 RX ADMIN — LOSARTAN POTASSIUM 50 MILLIGRAM(S): 100 TABLET, FILM COATED ORAL at 05:44

## 2020-10-21 RX ADMIN — Medication 20 MILLIGRAM(S): at 05:43

## 2020-10-21 RX ADMIN — CARVEDILOL PHOSPHATE 3.12 MILLIGRAM(S): 80 CAPSULE, EXTENDED RELEASE ORAL at 05:44

## 2020-10-21 RX ADMIN — ENOXAPARIN SODIUM 40 MILLIGRAM(S): 100 INJECTION SUBCUTANEOUS at 14:26

## 2020-10-21 RX ADMIN — Medication 1: at 13:12

## 2020-10-21 RX ADMIN — ATORVASTATIN CALCIUM 40 MILLIGRAM(S): 80 TABLET, FILM COATED ORAL at 22:49

## 2020-10-21 NOTE — ED CDU PROVIDER INITIAL DAY NOTE - MEDICAL DECISION MAKING DETAILS
CDU: 69 y/o F with CHF (EF25%) severe MR, HTN presenting with c/o chest pain and SOB. In ED, trop 11/13, pBNP 8155, CXR reveal cardiomegaly. patient accepted to CDU for telemonitoring, and echo the AM

## 2020-10-21 NOTE — H&P ADULT - NSHPLABSRESULTS_GEN_ALL_CORE
LABORATORY VALUES	 	                          11.7   6.03  )-----------( 241      ( 20 Oct 2020 19:55 )             38.9       10-20    144  |  108<H>  |  11  ----------------------------<  130<H>  3.8   |  25  |  0.74    Ca    8.9      20 Oct 2020 19:55    TPro  7.1  /  Alb  4.1  /  TBili  0.8  /  DBili  x   /  AST  43<H>  /  ALT  31  /  AlkPhos  116  10-20    LIVER FUNCTIONS - ( 20 Oct 2020 19:55 )  Alb: 4.1 g/dL / Pro: 7.1 g/dL / ALK PHOS: 116 u/L / ALT: 31 u/L / AST: 43 u/L / GGT: x             CARDIAC MARKERS:  Troponin T, High Sensitivity: 11 ng/L (10-20-20 @ 21:17)  Troponin T, High Sensitivity: 13 ng/L (10-20-20 @ 19:55)    Serum Pro-Brain Natriuretic Peptide: 8155 pg/mL (10-20 @ 19:55)      POCT Blood Glucose.: 160 mg/dL (21 Oct 2020 12:32) LABORATORY VALUES	 	                          11.7   6.03  )-----------( 241      ( 20 Oct 2020 19:55 )             38.9       10-20    144  |  108<H>  |  11  ----------------------------<  130<H>  3.8   |  25  |  0.74    Ca    8.9      20 Oct 2020 19:55    TPro  7.1  /  Alb  4.1  /  TBili  0.8  /  DBili  x   /  AST  43<H>  /  ALT  31  /  AlkPhos  116  10-20    LIVER FUNCTIONS - ( 20 Oct 2020 19:55 )  Alb: 4.1 g/dL / Pro: 7.1 g/dL / ALK PHOS: 116 u/L / ALT: 31 u/L / AST: 43 u/L / GGT: x             CARDIAC MARKERS:  Troponin T, High Sensitivity: 11 ng/L (10-20-20 @ 21:17)  Troponin T, High Sensitivity: 13 ng/L (10-20-20 @ 19:55)    Serum Pro-Brain Natriuretic Peptide: 8155 pg/mL (10-20 @ 19:55)    POCT Blood Glucose.: 160 mg/dL (21 Oct 2020 12:32)    Ejection Fraction (Teicholtz): 12 %  ------------------------------------------------------------------------  OBSERVATIONS:  Mitral Valve: Mitral annular calcification, otherwise  normal mitral valve. Severe mitral regurgitation.  Aortic Root: Normal aortic root.  Aortic Valve: Calcified trileaflet aortic valve with normal  opening.Mild aortic regurgitation.  Left Atrium: Severely dilated left atrium.  LA volume index  = 73 cc/m2.  Left Ventricle: Severe global left ventricular systolic  dysfunction. Severe left ventricular enlargement.  Right Heart: Mild right atrial enlargement. Normal right  ventricular size with decreased right ventricular systolic  function. Normal tricuspid valve.  Moderate-severe  tricuspid regurgitation. Normal pulmonic valve. Mild  pulmonic regurgitation.  Pericardium/PleuraSmall pericardial effusion superior to  the right atrium.  Hemodynamic: Estimated right ventricular systolic pressure  equals 65 mm Hg, assuming right atrial pressure equals 10  mm Hg, consistent with severe pulmonary hypertension.  ------------------------------------------------------------------------  CONCLUSIONS:  1. Mitral annular calcification, otherwise normal mitral  valve. Severe mitral regurgitation.  2. Calcified trileaflet aortic valve with normal opening.  Mild aortic regurgitation.  3. Severely dilated left atrium.  LA volume index = 73  cc/m2.  4. Severe left ventricular enlargement.  5. Severe global left ventricular systolic dysfunction.  6. Normal right ventricular size with decreased right  ventricular systolic function.  7. Estimated right ventricular systolic pressure equals 65  mm Hg, assuming right atrial pressure equals 10 mm Hg,  consistent with severe pulmonary hypertension.  8. Normal tricuspid valve.  Moderate-severe tricuspid  regurgitation.  *** Compared with echocardiogram of 5/14/2020, more mitral  regurgitation is noted on the current study.  ------------------------------------------------------------------------  Confirmed on  10/21/2020 - 08:35:30 by Ernie Johnson M.D.  ------------------------------------------------------------------------

## 2020-10-21 NOTE — H&P ADULT - PROBLEM SELECTOR PLAN 2
-associated with SUBRAMANIAN and lightheadness, likely 2/2 ADHF, last [facility] 5/20, no CAD noted, on asa and lipitor  - HsT 13>>11. neg delta. EKG unchanged from previous -associated with SUBRAMANIAN and lightheadedness, likely 2/2 ADHF, last [facility] 5/20, no CAD noted, on asa and lipitor  - HsT 13>>11. neg delta. EKG unchanged from previous

## 2020-10-21 NOTE — CONSULT NOTE ADULT - SUBJECTIVE AND OBJECTIVE BOX
Cardiology/Vascular Medicine Inpatient Consultation Note    PRELIM NOTE    HISTORY OF PRESENT ILLNESS:  Patient is a 70 year old woman with HFrEF, dilated cardiomyopathy (LVEF 25%) with severe functional MR, hypertension, DM2 presents with worsening chest pain and shortness of breath x3 weeks.Intermittent sided chest pain radiating to L arm, exacerbated with activity/ exertion, improves with rest.  Currently chest pain free. She reports dyspnea on exertion, unable to perform daily activities due to increased fatigue and dizziness. She reports poor po intake, though often feels "bloated and gassy", denies n/v/d. She endorsed orthopnea,> 2 pillows when sleeping.  On exam, she is unable to lay flat.    Patient reports compliance with dietary and fluid restriction.  (21 Oct 2020 12:57)          Allergies  penicillin (Unknown)    MEDICATIONS:  aspirin enteric coated 81 milliGRAM(s) Oral daily  carvedilol 3.125 milliGRAM(s) Oral every 12 hours  enoxaparin Injectable 40 milliGRAM(s) SubCutaneous daily  furosemide   Injectable 40 milliGRAM(s) IV Push two times a day  losartan 50 milliGRAM(s) Oral daily  atorvastatin 40 milliGRAM(s) Oral at bedtime  dextrose 40% Gel 15 Gram(s) Oral once PRN  dextrose 50% Injectable 12.5 Gram(s) IV Push once  dextrose 50% Injectable 25 Gram(s) IV Push once  dextrose 50% Injectable 25 Gram(s) IV Push once  glucagon  Injectable 1 milliGRAM(s) IntraMuscular once PRN  insulin lispro (ADMELOG) corrective regimen sliding scale   SubCutaneous three times a day before meals  insulin lispro (ADMELOG) corrective regimen sliding scale   SubCutaneous at bedtime  dextrose 5%. 1000 milliLiter(s) IV Continuous <Continuous>    PAST MEDICAL & SURGICAL HISTORY:  Heart failure  Dilated cardiomyopathy  HTN (hypertension)  DM (diabetes mellitus)  Type II  No significant past surgical history    FAMILY HISTORY:  No pertinent family history in first degree relatives    SOCIAL HISTORY:    [place]    REVIEW OF SYSTEMS:  As above, as per chart notes    PHYSICAL EXAM:  T(C): 36.4 (10-21-20 @ 12:24), Max: 36.9 (10-20-20 @ 21:44)  HR: 76 (10-21-20 @ 12:24) (66 - 76)  BP: 138/90 (10-21-20 @ 12:24) (133/85 - 166/96)  RR: 16 (10-21-20 @ 12:24) (15 - 22)  SpO2: 100% (10-21-20 @ 12:24) (97% - 100%)    Appearance: NAD, seen eating lunch, not wearing NC  HEENT:   +JVD  Cardiovascular: Normal S1 S2, No JVD, No murmurs, No edema  Respiratory: Decreased breath sounds bilaterally  Psychiatry: Awake, alert, Creole-speaking  Gastrointestinal:  Soft, Non-tender, + BS	  Neurologic: Non-focal  Extremities: Minimal b/l ankle edema      LABS:	 	                          11.4   5.77  )-----------( 217      ( 21 Oct 2020 14:58 )             38.2     10-20    144  |  108<H>  |  11  ----------------------------<  130<H>  3.8   |  25  |  0.74    Ca    8.9      20 Oct 2020 19:55    TPro  7.1  /  Alb  4.1  /  TBili  0.8  /  DBili  x   /  AST  43<H>  /  ALT  31  /  AlkPhos  116  10-20      proBNP: Serum Pro-Brain Natriuretic Peptide: 8155 pg/mL (10-20-20 @ 19:55)    < from: Xray Chest 2 Views PA/Lat (10.20.20 @ 20:33) >  EXAM:  XR CHEST PA LAT 2V        PROCEDURE DATE:  Oct 20 2020         INTERPRETATION:  CLINICAL INDICATION: Shortness of breath    TECHNIQUE: 2 views of the chest was obtained.    COMPARISON: None.    FINDINGS:    Lungs are clear. There is a trace right pleural effusion.  Cardiac size is enlarged and stable. No acute osseous finding.    IMPRESSION:  Clear lungs. Cardiomegaly            LUISA NORIEGA M.D., RADIOLOGY RESIDENT  This document has been electronically signed.  LAZARO JORDAN M.D., ATTENDINGRADIOLOGIST  This document has been electronically signed. Oct 21 2020  8:53AM    < end of copied text >  < from: Transthoracic Echocardiogram (10.21.20 @ 06:50) >    Patient name: LALY MAKI  YOB: 1950   Age: 70 (F)   MR#: [id number]  Study Date: 10/21/2020  Location: O/PSonographer: JASPAL Tan  Study quality: Technically good  Referring Physician: Not Available Doctor, MD  Blood Pressure: 138/85 mmHg  Height: 168 cm  Weight: 70 kg  BSA: 1.8 m2  ------------------------------------------------------------------------  PROCEDURE: Transthoracic echocardiogram with 2-D, M-Mode  and complete spectral and color flow Doppler.  INDICATION: Chest pain, unspecified (R07.9)  ------------------------------------------------------------------------  DIMENSIONS:  Dimensions:     Normal Values:  LA:     4.4 cm    2.0 - 4.0 cm  Ao:     3.6 cm    2.0 - 3.8 cm  SEPTUM: 0.8 cm    0.6 - 1.2 cm  PWT:    0.8 cm    0.6 - 1.1 cm  LVIDd:  7.1 cm    3.0 - 5.6 cm  LVIDs:  6.7 cm    1.8 - 4.0 cm  Derived Variables:  LVMI: 140 g/m2  RWT: 0.22  Fractional short: 6 %  Ejection Fraction (Teicholtz): 12 %  ------------------------------------------------------------------------  OBSERVATIONS:  Mitral Valve: Mitral annular calcification, otherwise  normal mitral valve. Severe mitral regurgitation.  Aortic Root: Normal aortic root.  Aortic Valve: Calcified trileaflet aortic valve with normal  opening.Mild aortic regurgitation.  Left Atrium: Severely dilated left atrium.  LA volume index  = 73 cc/m2.  Left Ventricle: Severe global left ventricular systolic  dysfunction. Severe left ventricular enlargement.  Right Heart: Mild right atrial enlargement. Normal right  ventricular size with decreased right ventricular systolic  function. Normal tricuspid valve.  Moderate-severe  tricuspid regurgitation. Normal pulmonic valve. Mild  pulmonic regurgitation.  Pericardium/PleuraSmall pericardial effusion superior to  the right atrium.  Hemodynamic: Estimated right ventricular systolic pressure  equals 65 mm Hg, assuming right atrial pressure equals 10  mm Hg, consistent with severe pulmonary hypertension.  ------------------------------------------------------------------------  CONCLUSIONS:  1. Mitral annular calcification, otherwise normal mitral  valve. Severe mitral regurgitation.  2. Calcified trileaflet aortic valve with normal opening.  Mild aortic regurgitation.  3. Severely dilated left atrium.  LA volume index = 73  cc/m2.  4. Severe left ventricular enlargement.  5. Severe global left ventricular systolic dysfunction.  6. Normal right ventricular size with decreased right  ventricular systolic function.  7. Estimated right ventricular systolic pressure equals 65  mm Hg, assuming right atrial pressure equals 10 mm Hg,  consistent with severe pulmonary hypertension.  8. Normal tricuspid valve.  Moderate-severe tricuspid  regurgitation.  *** Compared with echocardiogram of 5/14/2020, more mitral  regurgitation is noted on the current study.  ------------------------------------------------------------------------  Confirmed on  10/21/2020 - 08:35:30 by Ernie Johnson M.D.  ------------------------------------------------------------------------    < end of copied text >     Cardiology/Vascular Medicine Inpatient Consultation Note    PRELIM NOTE    HISTORY OF PRESENT ILLNESS:  Patient is a 70 year old woman with HFrEF, dilated cardiomyopathy (LVEF 25%) with severe functional MR, hypertension, DM2 presents with worsening chest pain and shortness of breath x3 weeks.Intermittent sided chest pain radiating to L arm, exacerbated with activity/ exertion, improves with rest.  Currently chest pain free. She reports dyspnea on exertion, unable to perform daily activities due to increased fatigue and dizziness. She reports poor po intake, though often feels "bloated and gassy", denies n/v/d. She endorsed orthopnea,> 2 pillows when sleeping.  On exam by the admitting Medicine team, the patient was orthopneic.  She was started on IV furosemide with marked improvement in symptoms.    At the time of my examination, the patient appeared clinically and hemodynamically stable.  VSS.  She was on O2 NC at 2L with good O2 saturation.  Physical examination was notable for coarse breath sounds bilaterally.  No LE edema.    At this time, recommend:  1. Continue monitoring on telemetry -- no interval events noted  2. Continue IV diuresis with furosemide (currently on 40 mg IV BID). Strict I/Os, daily standing weights, fluid restriction to 1.5 L/24 hrs.    3, Continue Coreg 3.125 BID. Can advance dose to 6.25 BID.  4. Stop Losartan and start Entresto as per protocol.  5. Continue low dose daily aspirin  6. Echocardiogram    Allergies  penicillin (Unknown)    MEDICATIONS:  aspirin enteric coated 81 milliGRAM(s) Oral daily  carvedilol 3.125 milliGRAM(s) Oral every 12 hours  enoxaparin Injectable 40 milliGRAM(s) SubCutaneous daily  furosemide   Injectable 40 milliGRAM(s) IV Push two times a day  losartan 50 milliGRAM(s) Oral daily  atorvastatin 40 milliGRAM(s) Oral at bedtime  dextrose 40% Gel 15 Gram(s) Oral once PRN  dextrose 50% Injectable 12.5 Gram(s) IV Push once  dextrose 50% Injectable 25 Gram(s) IV Push once  dextrose 50% Injectable 25 Gram(s) IV Push once  glucagon  Injectable 1 milliGRAM(s) IntraMuscular once PRN  insulin lispro (ADMELOG) corrective regimen sliding scale   SubCutaneous three times a day before meals  insulin lispro (ADMELOG) corrective regimen sliding scale   SubCutaneous at bedtime  dextrose 5%. 1000 milliLiter(s) IV Continuous <Continuous>    PAST MEDICAL & SURGICAL HISTORY:  Heart failure  Dilated cardiomyopathy  HTN (hypertension)  DM (diabetes mellitus)  Type II  No significant past surgical history    FAMILY HISTORY:  No pertinent family history in first degree relatives    SOCIAL HISTORY:    NC    REVIEW OF SYSTEMS:  As above, as per chart notes    PHYSICAL EXAM:  T(C): 36.4 (10-21-20 @ 12:24), Max: 36.9 (10-20-20 @ 21:44)  HR: 76 (10-21-20 @ 12:24) (66 - 76)  BP: 138/90 (10-21-20 @ 12:24) (133/85 - 166/96)  RR: 16 (10-21-20 @ 12:24) (15 - 22)  SpO2: 100% (10-21-20 @ 12:24) (97% - 100%)    Appearance: NAD, seen eating lunch, not wearing NC  HEENT:   +JVD  Cardiovascular: Normal S1 S2, No JVD, No murmurs, No edema  Respiratory: Decreased breath sounds bilaterally  Psychiatry: Awake, alert, Creole-speaking  Gastrointestinal:  Soft, Non-tender, + BS	  Neurologic: Non-focal  Extremities: Minimal b/l ankle edema      LABS:	 	                          11.4   5.77  )-----------( 217      ( 21 Oct 2020 14:58 )             38.2     10-20    144  |  108<H>  |  11  ----------------------------<  130<H>  3.8   |  25  |  0.74    Ca    8.9      20 Oct 2020 19:55    TPro  7.1  /  Alb  4.1  /  TBili  0.8  /  DBili  x   /  AST  43<H>  /  ALT  31  /  AlkPhos  116  10-20      proBNP: Serum Pro-Brain Natriuretic Peptide: 8155 pg/mL (10-20-20 @ 19:55)    < from: Xray Chest 2 Views PA/Lat (10.20.20 @ 20:33) >  EXAM:  XR CHEST PA LAT 2V        PROCEDURE DATE:  Oct 20 2020         INTERPRETATION:  CLINICAL INDICATION: Shortness of breath    TECHNIQUE: 2 views of the chest was obtained.    COMPARISON: None.    FINDINGS:    Lungs are clear. There is a trace right pleural effusion.  Cardiac size is enlarged and stable. No acute osseous finding.    IMPRESSION:  Clear lungs. Cardiomegaly            LUISA NORIEGA M.D., RADIOLOGY RESIDENT  This document has been electronically signed.  LAZARO JORDAN M.D., ATTENDINGRADIOLOGIST  This document has been electronically signed. Oct 21 2020  8:53AM    < end of copied text >  < from: Transthoracic Echocardiogram (10.21.20 @ 06:50) >    Patient name: LALY MAKI  YOB: 1950   Age: 70 (F)   MR#: [id number]  Study Date: 10/21/2020  Location: O/PSonographer: JASPAL Tan  Study quality: Technically good  Referring Physician: Not Available Doctor, MD  Blood Pressure: 138/85 mmHg  Height: 168 cm  Weight: 70 kg  BSA: 1.8 m2  ------------------------------------------------------------------------  PROCEDURE: Transthoracic echocardiogram with 2-D, M-Mode  and complete spectral and color flow Doppler.  INDICATION: Chest pain, unspecified (R07.9)  ------------------------------------------------------------------------  DIMENSIONS:  Dimensions:     Normal Values:  LA:     4.4 cm    2.0 - 4.0 cm  Ao:     3.6 cm    2.0 - 3.8 cm  SEPTUM: 0.8 cm    0.6 - 1.2 cm  PWT:    0.8 cm    0.6 - 1.1 cm  LVIDd:  7.1 cm    3.0 - 5.6 cm  LVIDs:  6.7 cm    1.8 - 4.0 cm  Derived Variables:  LVMI: 140 g/m2  RWT: 0.22  Fractional short: 6 %  Ejection Fraction (Charlesicholtz): 12 %  ------------------------------------------------------------------------  OBSERVATIONS:  Mitral Valve: Mitral annular calcification, otherwise  normal mitral valve. Severe mitral regurgitation.  Aortic Root: Normal aortic root.  Aortic Valve: Calcified trileaflet aortic valve with normal  opening.Mild aortic regurgitation.  Left Atrium: Severely dilated left atrium.  LA volume index  = 73 cc/m2.  Left Ventricle: Severe global left ventricular systolic  dysfunction. Severe left ventricular enlargement.  Right Heart: Mild right atrial enlargement. Normal right  ventricular size with decreased right ventricular systolic  function. Normal tricuspid valve.  Moderate-severe  tricuspid regurgitation. Normal pulmonic valve. Mild  pulmonic regurgitation.  Pericardium/PleuraSmall pericardial effusion superior to  the right atrium.  Hemodynamic: Estimated right ventricular systolic pressure  equals 65 mm Hg, assuming right atrial pressure equals 10  mm Hg, consistent with severe pulmonary hypertension.  ------------------------------------------------------------------------  CONCLUSIONS:  1. Mitral annular calcification, otherwise normal mitral  valve. Severe mitral regurgitation.  2. Calcified trileaflet aortic valve with normal opening.  Mild aortic regurgitation.  3. Severely dilated left atrium.  LA volume index = 73  cc/m2.  4. Severe left ventricular enlargement.  5. Severe global left ventricular systolic dysfunction.  6. Normal right ventricular size with decreased right  ventricular systolic function.  7. Estimated right ventricular systolic pressure equals 65  mm Hg, assuming right atrial pressure equals 10 mm Hg,  consistent with severe pulmonary hypertension.  8. Normal tricuspid valve.  Moderate-severe tricuspid  regurgitation.  *** Compared with echocardiogram of 5/14/2020, more mitral  regurgitation is noted on the current study.  ------------------------------------------------------------------------  Confirmed on  10/21/2020 - 08:35:30 by Ernie Johnson M.D.  ------------------------------------------------------------------------    < end of copied text >     Cardiology/Vascular Medicine Inpatient Consultation Note    HISTORY OF PRESENT ILLNESS:  Patient is a 70 year old woman with HFrEF, dilated cardiomyopathy (LVEF 25%) with severe functional MR, hypertension, DM2 presents with worsening chest pain and shortness of breath x3 weeks.Intermittent sided chest pain radiating to L arm, exacerbated with activity/ exertion, improves with rest.  Currently chest pain free. She reports dyspnea on exertion, unable to perform daily activities due to increased fatigue and dizziness. She reports poor po intake, though often feels "bloated and gassy", denies n/v/d. She endorsed orthopnea,> 2 pillows when sleeping.  On exam by the admitting Medicine team, the patient was orthopneic.  She was started on IV furosemide with marked improvement in symptoms.    At the time of my examination, the patient appeared clinically and hemodynamically stable.  VSS.  She was on O2 NC at 2L with good O2 saturation.  Physical examination was notable for coarse breath sounds bilaterally.  No LE edema.    At this time, recommend:  1. Continue monitoring on telemetry -- no interval events noted  2. Continue IV diuresis with furosemide (currently on 40 mg IV BID). Strict I/Os, daily standing weights, fluid restriction to 1.5 L/24 hrs.    3, Continue Coreg 3.125 BID. Can advance dose to 6.25 BID.  4. Stop Losartan and start Entresto as per protocol.  5. Continue low dose daily aspirin  6. Echocardiogram    Allergies  penicillin (Unknown)    MEDICATIONS:  aspirin enteric coated 81 milliGRAM(s) Oral daily  carvedilol 3.125 milliGRAM(s) Oral every 12 hours  enoxaparin Injectable 40 milliGRAM(s) SubCutaneous daily  furosemide   Injectable 40 milliGRAM(s) IV Push two times a day  losartan 50 milliGRAM(s) Oral daily  atorvastatin 40 milliGRAM(s) Oral at bedtime  dextrose 40% Gel 15 Gram(s) Oral once PRN  dextrose 50% Injectable 12.5 Gram(s) IV Push once  dextrose 50% Injectable 25 Gram(s) IV Push once  dextrose 50% Injectable 25 Gram(s) IV Push once  glucagon  Injectable 1 milliGRAM(s) IntraMuscular once PRN  insulin lispro (ADMELOG) corrective regimen sliding scale   SubCutaneous three times a day before meals  insulin lispro (ADMELOG) corrective regimen sliding scale   SubCutaneous at bedtime  dextrose 5%. 1000 milliLiter(s) IV Continuous <Continuous>    PAST MEDICAL & SURGICAL HISTORY:  Heart failure  Dilated cardiomyopathy  HTN (hypertension)  DM (diabetes mellitus)  Type II  No significant past surgical history    FAMILY HISTORY:  No pertinent family history in first degree relatives    SOCIAL HISTORY:    NC    REVIEW OF SYSTEMS:  As above, as per chart notes    PHYSICAL EXAM:  T(C): 36.4 (10-21-20 @ 12:24), Max: 36.9 (10-20-20 @ 21:44)  HR: 76 (10-21-20 @ 12:24) (66 - 76)  BP: 138/90 (10-21-20 @ 12:24) (133/85 - 166/96)  RR: 16 (10-21-20 @ 12:24) (15 - 22)  SpO2: 100% (10-21-20 @ 12:24) (97% - 100%)    Appearance: NAD, seen eating lunch, not wearing NC  HEENT:   +JVD  Cardiovascular: Normal S1 S2, No JVD, No murmurs, No edema  Respiratory: Decreased breath sounds bilaterally  Psychiatry: Awake, alert, Creole-speaking  Gastrointestinal:  Soft, Non-tender, + BS	  Neurologic: Non-focal  Extremities: Minimal b/l ankle edema      LABS:	 	                          11.4   5.77  )-----------( 217      ( 21 Oct 2020 14:58 )             38.2     10-20    144  |  108<H>  |  11  ----------------------------<  130<H>  3.8   |  25  |  0.74    Ca    8.9      20 Oct 2020 19:55    TPro  7.1  /  Alb  4.1  /  TBili  0.8  /  DBili  x   /  AST  43<H>  /  ALT  31  /  AlkPhos  116  10-20      proBNP: Serum Pro-Brain Natriuretic Peptide: 8155 pg/mL (10-20-20 @ 19:55)    < from: Xray Chest 2 Views PA/Lat (10.20.20 @ 20:33) >  EXAM:  XR CHEST PA LAT 2V        PROCEDURE DATE:  Oct 20 2020         INTERPRETATION:  CLINICAL INDICATION: Shortness of breath    TECHNIQUE: 2 views of the chest was obtained.    COMPARISON: None.    FINDINGS:    Lungs are clear. There is a trace right pleural effusion.  Cardiac size is enlarged and stable. No acute osseous finding.    IMPRESSION:  Clear lungs. Cardiomegaly            LUISA LEANN M.D., RADIOLOGY RESIDENT  This document has been electronically signed.  LAZARO JORDAN M.D., ATTENDINGRADIOLOGIST  This document has been electronically signed. Oct 21 2020  8:53AM    < end of copied text >  < from: Transthoracic Echocardiogram (10.21.20 @ 06:50) >    Patient name: LALY MAKI  YOB: 1950   Age: 70 (F)   MR#: [id number]  Study Date: 10/21/2020  Location: O/PSonographer: JASPAL Tan  Study quality: Technically good  Referring Physician: Not Available Doctor, MD  Blood Pressure: 138/85 mmHg  Height: 168 cm  Weight: 70 kg  BSA: 1.8 m2  ------------------------------------------------------------------------  PROCEDURE: Transthoracic echocardiogram with 2-D, M-Mode  and complete spectral and color flow Doppler.  INDICATION: Chest pain, unspecified (R07.9)  ------------------------------------------------------------------------  DIMENSIONS:  Dimensions:     Normal Values:  LA:     4.4 cm    2.0 - 4.0 cm  Ao:     3.6 cm    2.0 - 3.8 cm  SEPTUM: 0.8 cm    0.6 - 1.2 cm  PWT:    0.8 cm    0.6 - 1.1 cm  LVIDd:  7.1 cm    3.0 - 5.6 cm  LVIDs:  6.7 cm    1.8 - 4.0 cm  Derived Variables:  LVMI: 140 g/m2  RWT: 0.22  Fractional short: 6 %  Ejection Fraction (Charlesicholtz): 12 %  ------------------------------------------------------------------------  OBSERVATIONS:  Mitral Valve: Mitral annular calcification, otherwise  normal mitral valve. Severe mitral regurgitation.  Aortic Root: Normal aortic root.  Aortic Valve: Calcified trileaflet aortic valve with normal  opening.Mild aortic regurgitation.  Left Atrium: Severely dilated left atrium.  LA volume index  = 73 cc/m2.  Left Ventricle: Severe global left ventricular systolic  dysfunction. Severe left ventricular enlargement.  Right Heart: Mild right atrial enlargement. Normal right  ventricular size with decreased right ventricular systolic  function. Normal tricuspid valve.  Moderate-severe  tricuspid regurgitation. Normal pulmonic valve. Mild  pulmonic regurgitation.  Pericardium/PleuraSmall pericardial effusion superior to  the right atrium.  Hemodynamic: Estimated right ventricular systolic pressure  equals 65 mm Hg, assuming right atrial pressure equals 10  mm Hg, consistent with severe pulmonary hypertension.  ------------------------------------------------------------------------  CONCLUSIONS:  1. Mitral annular calcification, otherwise normal mitral  valve. Severe mitral regurgitation.  2. Calcified trileaflet aortic valve with normal opening.  Mild aortic regurgitation.  3. Severely dilated left atrium.  LA volume index = 73  cc/m2.  4. Severe left ventricular enlargement.  5. Severe global left ventricular systolic dysfunction.  6. Normal right ventricular size with decreased right  ventricular systolic function.  7. Estimated right ventricular systolic pressure equals 65  mm Hg, assuming right atrial pressure equals 10 mm Hg,  consistent with severe pulmonary hypertension.  8. Normal tricuspid valve.  Moderate-severe tricuspid  regurgitation.  *** Compared with echocardiogram of 5/14/2020, more mitral  regurgitation is noted on the current study.  ------------------------------------------------------------------------  Confirmed on  10/21/2020 - 08:35:30 by Ernie Johnson M.D.  ------------------------------------------------------------------------    < end of copied text >

## 2020-10-21 NOTE — H&P ADULT - HISTORY OF PRESENT ILLNESS
70 year old female with PMHx of HFrEF, dilated cardiomyopathy (EF 25%) with severe functional MR, hypertension, DM2 presents with worsening chest pain and shortness of breath x3 weeks.ntermittent sided chest pain radiating to L arm. Occurs with exertion.  Currently chest pain free. She reports dyspnea on exertion, unable to perform daily activities due to increased fatigue and dizziness. She reports poor po intake, denies n/v/d. She endorsed orthopnea, 2 pillows when sleeping.  On exam, she is unable to lay flat.     reports insomnia urinary incontince      Patient reports compliance with dietary and fluid restriction. 70 year old female with PMHx of HFrEF, dilated cardiomyopathy (EF 25%) with severe functional MR, hypertension, DM2 presents with worsening chest pain and shortness of breath x3 weeks.Intermittent sided chest pain radiating to L arm, exacerbated with activity/ exertion, improves with rest.  Currently chest pain free. She reports dyspnea on exertion, unable to perform daily activities due to increased fatigue and dizziness. She reports poor po intake, though often feels "bloated and gassy", denies n/v/d. She endorsed orthopnea,> 2 pillows when sleeping.  On exam, she is unable to lay flat.    Patient reports compliance with dietary and fluid restriction.

## 2020-10-21 NOTE — ED CDU PROVIDER INITIAL DAY NOTE - CROS ED CONS ALL NEG
SUBJECTIVE:    Patient ID: Flaco Navarrete is a 82 y.o. male. CC: Allergies, hypertension, back pain, leg weakness, fatigue    HPI: The patient presents to the office today for routine follow-up of hypertension, lower back pain, weakness, and fatigue. He has been having some allergy issues. Sinus congestion and drainage. He has history of hypertension. Marleni Carlson denies chest pain or dyspnea.  He is compliant with his medication regimen.     Back pain, leg weakness, fatigue: Not a new problem. He has history of lumbar stenosis with associated leg weakness and difficulty walking.       Constipation: About the same. Not better or worse. No abdominal pain. No bloody stools. Past Medical History:   Diagnosis Date    Alzheimer disease     very mild    Arthritis     both hands    Back problem     chronic/resulting from fall    Cancer (HCC)     prostate    Hyperlipidemia     Hypertension     Irregular heartbeat     patient doesn't know details    Osteoarthritis         Current Outpatient Prescriptions   Medication Sig Dispense Refill    furosemide (LASIX) 20 MG tablet TAKE 1 TABLET DAILY 90 tablet 1    omeprazole (PRILOSEC) 20 MG delayed release capsule Take 1 capsule by mouth Daily 90 capsule 1    fluticasone (FLONASE) 50 MCG/ACT nasal spray 2 sprays by Nasal route daily 1 Bottle 5    losartan (COZAAR) 100 MG tablet Take 1 tablet by mouth daily 90 tablet 1    tolterodine (DETROL) 1 MG tablet TAKE 1 TABLET TWICE A  tablet 1    diltiazem (CARTIA XT) 240 MG extended release capsule TAKE 1 CAPSULE DAILY 90 capsule 3    Cyanocobalamin (B-12 PO) Take by mouth      aspirin 81 MG chewable tablet Take 81 mg by mouth daily. No current facility-administered medications for this visit. Review of Systems   Constitutional: Negative for fever. HENT: Positive for congestion and postnasal drip. Respiratory: Negative. Cardiovascular: Negative.     Gastrointestinal: Positive for constipation. Negative for abdominal pain, blood in stool and diarrhea. Genitourinary: Negative. Skin: Negative. All other systems reviewed and are negative. OBJECTIVE:  Physical Exam   Constitutional: He appears well-developed and well-nourished. No distress. HENT:   Head: Normocephalic and atraumatic. Eyes: Conjunctivae are normal. No scleral icterus. Neck: Neck supple. No JVD present. Cardiovascular: Normal rate and regular rhythm. Pulmonary/Chest: Effort normal and breath sounds normal.   Abdominal: Soft. He exhibits no distension. There is no tenderness. There is no guarding. Musculoskeletal: Normal range of motion. He exhibits no edema. Neurological: He is alert. Skin: Skin is warm and dry. He is not diaphoretic. Psychiatric: He has a normal mood and affect. His behavior is normal. Thought content normal. Cognition and memory are impaired. /74   Pulse 68   Ht 5' 7\" (1.702 m)   Wt 202 lb (91.6 kg)   BMI 31.64 kg/m²          ASSESSMENT/PLAN:  Donavon Singh was seen today for follow-up. Diagnoses and all orders for this visit:    Chronic seasonal allergic rhinitis, unspecified trigger  -     cetirizine (ZYRTEC) 10 MG chewable tablet; Take 1 tablet by mouth daily    Essential hypertension, benign  - Normotensive  - he has met JNC standards.  - Continue current regimen. Spinal stenosis of lumbar region with neurogenic claudication  - Chronic, unchanged  - Continue activity, Tylenol    Late onset Alzheimer's disease without behavioral disturbance  - Chronic, seems stable. No family here with him  - Continue current regimen    Chronic constipation  - Chronic, intermittent  - Add fiber. OK to continue stool softener    Need for shingles vaccine  -     zoster recombinant adjuvanted vaccine (SHINGRIX) 50 MCG SUSR injection;  Inject 0.5 mLs into the muscle every 3 months for 2 doses        Erum Atkinson, CNP negative...

## 2020-10-21 NOTE — ED CDU PROVIDER DISPOSITION NOTE - ATTENDING CONTRIBUTION TO CARE
69 y/o F with CHF (EF25%) severe MR, HTN, DM, p/w intermittent CP and sob x 3 weeks. Pt states her symptoms had improved until 3 weeks ago when she began to have intermittent Lt side chest pain described as pressure w/ sob. Follows Dr. marmolejo. She states she is compliant with her home CHF medications losartan lasix, carvedilol. She reports having some postural lightheadedness for several days. denies syncope. She denies weight gain or SOB. She was was ssen in the ed w/ initial presentation of chest pain and SOB. In ED, trop 11/13, pBNP 8155, CXR reveal cardiomegaly and was placed in CDU for ECHO and tele monitoring.   echo showed  < from: Transthoracic Echocardiogram (10.21.20 @ 06:50) >    CONCLUSIONS:  1. Mitral annular calcification, otherwise normal mitral  valve. Severe mitral regurgitation.  2. Calcified trileaflet aortic valve with normal opening.  Mild aortic regurgitation.  3. Severely dilated left atrium.  LA volume index = 73  cc/m2.  4. Severe left ventricular enlargement.  5. Severe global left ventricular systolic dysfunction ef 12%  6. Normal right ventricular size with decreased right  ventricular systolic function.  7. Estimated right ventricular systolic pressure equals 65  mm Hg, assuming right atrial pressure equals 10 mm Hg,  consistent with severe pulmonary hypertension.  8. Normal tricuspid valve.  Moderate-severe tricuspid  regurgitation.    cardiology was consulted and admission was recommended.   Overnight pt denies chest pain sob   denies fever, chills, chest pain, SOB, abdominal pain, diarrhea, dysuria, syncope, bleeding, new rash,weakness, numbness, blurred vision    ROS  otherwise negative as per HPI  Gen: Awake, Alert, WD, WN, NAD  Head:  NC/AT  Eyes:  PERRL, EOMI, Conjunctiva pink, lids normal, no scleral icterus  ENT: OP clear, no exudates, no erythema, uvula midline, TMs clear bilaterally, moist mucus membranes  Neck: , trachea midline  Cardiac/CV:  S1 S2, RRR, systolic murmur   Respiratory/Pulm:  CTAB, good air movement  Gastrointestinal/Abdomen:  Soft, nontender, nondistended, +BS, no rebound/guarding  Ext:  warm, well perfused, moving all extremities spontaneously, 1+ peripheral edema, distal pulses intact  Skin: intact, no rash  Neuro:  AAOx3, sensation intact, motor 5/5 x 4 extremities, speech clear  admission to telemetry

## 2020-10-21 NOTE — H&P ADULT - ATTENDING COMMENTS
Patient seen and examined. Case discussed with Shayy YEE. I agree with the findings and the plan above.     Jeanie Godoy is a 70 year old lady h/o HFrEF (prior EF~25) c/b mitral valve regurgitation, HTN, T2DM p/w progressive chest pain and shortness of breath for 3 weeks 2/2 worsening heart failure. Appears relatively comfortable on nasal O2, however does appear dyspneic after finishing a complete sentence. Plan D/W jude Godoy, patient's  at bedside.    CXR with cardiomegaly, Echo shows that EF now is at 12%  Will continue to attempt to medically optimize with diuresis and follow final recs from ned, Dr. Sommer

## 2020-10-21 NOTE — H&P ADULT - PROBLEM SELECTOR PLAN 1
-reported SUBRAMANIAN and exertional CP, + JVD, 2+b/l lower extremity pitting edema  -proBNP 8155  - TTE showed worsening EF now 12% and worsening MR  - covid pcr neg  -CXR w/ trace right pleural effusion. lungs clear  -c/w coreg, losartan  - cards consulted reccs appreciated, will add entresto  - aggressive diuresis, lasix 40mg IVP BID with net goal -1.5L net, if poor urinary output, increase lasix to meet goal  -Trend BMP 2/2 diuresis and replete as needed  -Daily weight monitoring, Strict I/O, Low sodium DASH diet -reported SUBRAMANIAN and exertional CP, + JVD, 2+b/l lower extremity pitting edema  -proBNP 8155  - TTE showed worsening EF now 12% and worsening MR  -CXR w/ trace right pleural effusion. lungs clear  -c/w coreg, losartan  - cards consulted reccs appreciated, will add entresto  - aggressive diuresis, lasix 40mg IVP BID with net goal -1.5L net, if poor urinary output, increase lasix to meet goal  -Trend BMP 2/2 diuresis and replete as needed  -Daily weight monitoring, Strict I/O, Low sodium DASH diet  - covid pcr neg

## 2020-10-21 NOTE — ED ADULT NURSE REASSESSMENT NOTE - NS ED NURSE REASSESS COMMENT FT1
Report given to AMY Lopez in CDU. Pt resting comfortably at this time, resp. even and unlabored. Denies any complaints at this time. VS as noted. NAD. Transported to CDU.

## 2020-10-21 NOTE — H&P ADULT - NEGATIVE GASTROINTESTINAL SYMPTOMS
no melena/no change in bowel habits/no flatulence/no abdominal pain/no constipation/no vomiting/no diarrhea/no nausea

## 2020-10-21 NOTE — CONSULT NOTE ADULT - ASSESSMENT
70 year old female with PMHx of HFrEF, dilated cardiomyopathy (EF 25%) with severe functional MR, hypertension, DM2 presents with worsening chest pain and shortness of breath x3 weeks. Admit to telemetry Patient is a 70 year old woman with HFrEF, dilated cardiomyopathy (LVEF 25%) with severe functional MR, hypertension, DM2 presents with worsening chest pain and shortness of breath x3 weeks.Intermittent sided chest pain radiating to L arm, exacerbated with activity/ exertion, improves with rest.  Currently chest pain free. She reports dyspnea on exertion, unable to perform daily activities due to increased fatigue and dizziness. She reports poor po intake, though often feels "bloated and gassy", denies n/v/d. She endorsed orthopnea,> 2 pillows when sleeping.  On exam by the admitting Medicine team, the patient was orthopneic.  She was started on IV furosemide with marked improvement in symptoms.    At the time of my examination, the patient appeared clinically and hemodynamically stable.  VSS.  She was on O2 NC at 2L with good O2 saturation.  Physical examination was notable for coarse breath sounds bilaterally.  No LE edema.    At this time, recommend:  1. Continue monitoring on telemetry -- no interval events noted  2. Continue IV diuresis with furosemide (currently on 40 mg IV BID). Strict I/Os, daily standing weights, fluid restriction to 1.5 L/24 hrs.    3, Continue Coreg 3.125 BID. Can advance dose to 6.25 BID.  4. Stop Losartan and start Entresto as per protocol.  5. Continue low dose daily aspirin  6. Echocardiogram

## 2020-10-21 NOTE — H&P ADULT - NEGATIVE MUSCULOSKELETAL SYMPTOMS
no arthralgia/no arthritis/no joint swelling/no muscle weakness/no myalgia/no arm pain L/no back pain/no muscle cramps/no stiffness/no neck pain/no arm pain R

## 2020-10-21 NOTE — ED CDU PROVIDER INITIAL DAY NOTE - PROGRESS NOTE DETAILS
Pt states her chest pain is improved. In ED, trop 11/13, pBNP 8155, CXR reveal cardiomegaly. Reassessment exam reveals Grade 3 murmur on auscultation, but clear lungs with no LE edema. Echo shows EF of 12%, worsened from last documented of 25%. Will call Dr. Ambrocio Sommer for recommendations on disposition. SPoke with Dr. Sommer who recommended patient to be admitted to hospital. Spoke with Pt's daughter at patients request. Let her know that patient will be admitted to bed 530A due to worsening of HF and gave her visiting hours information.

## 2020-10-21 NOTE — ED CDU PROVIDER INITIAL DAY NOTE - ATTENDING CONTRIBUTION TO CARE
69 y/o F with CHF (EF25%) severe MR, HTN, DM, p/w intermittent CP and sob x 3 weeks. Pt states her symptoms had improved until 3 weeks ago when she began to have intermittent Lt side chest pain described as pressure w/ sob. Follows Dr. marmolejo. She states she is compliant with her home CHF medications losartan lasix, carvedilol. She reports having some postural lightheadedness for several days. denies syncope. She denies weight gain or SOB. She was was ssen in the ed w/ initial presentation of chest pain and SOB. In ED, trop 11/13, pBNP 8155, CXR reveal cardiomegaly and was placed in CDU for ECHO and tele monitoring.     Overnight pt denies chest pain sob states she has not stood up to urinate so she is not sure if the symptoms would reoccur   denies fever, chills, chest pain, +SOB, abdominal pain, diarrhea, dysuria, syncope, bleeding, new rash,weakness, numbness, blurred vision    ROS  otherwise negative as per HPI  Gen: Awake, Alert, WD, WN, NAD  Head:  NC/AT  Eyes:  PERRL, EOMI, Conjunctiva pink, lids normal, no scleral icterus  ENT: OP clear, no exudates, no erythema, uvula midline, TMs clear bilaterally, moist mucus membranes  Neck: , trachea midline  Cardiac/CV:  S1 S2, RRR, systolic murmur   Respiratory/Pulm:  CTAB, good air movement  Gastrointestinal/Abdomen:  Soft, nontender, nondistended, +BS, no rebound/guarding  Ext:  warm, well perfused, moving all extremities spontaneously, 1+ peripheral edema, distal pulses intact  Skin: intact, no rash  Neuro:  AAOx3, sensation intact, motor 5/5 x 4 extremities, speech clear  pending echo and cardiology evaluation 71 y/o F with CHF (EF25%) severe MR, HTN, DM, p/w intermittent CP and sob x 3 weeks. Pt states her symptoms had improved until 3 weeks ago when she began to have intermittent Lt side chest pain described as pressure w/ sob. Follows Dr. marmolejo. She states she is compliant with her home CHF medications losartan lasix, carvedilol. She reports having some postural lightheadedness for several days. denies syncope. She denies weight gain or SOB. She was was ssen in the ed w/ initial presentation of chest pain and SOB. In ED, trop 11/13, pBNP 8155, CXR reveal cardiomegaly and was placed in CDU for ECHO and tele monitoring.    wnl. Overnight pt denies chest pain sob states she has not stood up to urinate so she is not sure if the symptoms would reoccur   denies fever, chills, chest pain, +SOB, abdominal pain, diarrhea, dysuria, syncope, bleeding, new rash,weakness, numbness, blurred vision    ROS  otherwise negative as per HPI  Gen: Awake, Alert, WD, WN, NAD  Head:  NC/AT  Eyes:  PERRL, EOMI, Conjunctiva pink, lids normal, no scleral icterus  ENT: OP clear, no exudates, no erythema, uvula midline, TMs clear bilaterally, moist mucus membranes  Neck: , trachea midline  Cardiac/CV:  S1 S2, RRR, systolic murmur   Respiratory/Pulm:  CTAB, good air movement  Gastrointestinal/Abdomen:  Soft, nontender, nondistended, +BS, no rebound/guarding  Ext:  warm, well perfused, moving all extremities spontaneously, 1+ peripheral edema, distal pulses intact  Skin: intact, no rash  Neuro:  AAOx3, sensation intact, motor 5/5 x 4 extremities, speech clear  pending echo and cardiology evaluation

## 2020-10-21 NOTE — H&P ADULT - NEGATIVE ENMT SYMPTOMS
no ear pain/no tinnitus/no nasal discharge/no nasal obstruction/no hearing difficulty/no vertigo/no nasal congestion/no sinus symptoms

## 2020-10-21 NOTE — H&P ADULT - ASSESSMENT
70 year old female with PMHx of HFrEF, dilated cardiomyopathy (EF 25%) with severe functional MR, hypertension, DM2 presents with worsening chest pain and shortness of breath x3 weeks. Admit to telemetry

## 2020-10-22 DIAGNOSIS — E87.6 HYPOKALEMIA: ICD-10-CM

## 2020-10-22 LAB
ANION GAP SERPL CALC-SCNC: 11 MMO/L — SIGNIFICANT CHANGE UP (ref 7–14)
BUN SERPL-MCNC: 19 MG/DL — SIGNIFICANT CHANGE UP (ref 7–23)
CALCIUM SERPL-MCNC: 8.5 MG/DL — SIGNIFICANT CHANGE UP (ref 8.4–10.5)
CHLORIDE SERPL-SCNC: 105 MMOL/L — SIGNIFICANT CHANGE UP (ref 98–107)
CO2 SERPL-SCNC: 25 MMOL/L — SIGNIFICANT CHANGE UP (ref 22–31)
CREAT SERPL-MCNC: 0.68 MG/DL — SIGNIFICANT CHANGE UP (ref 0.5–1.3)
GLUCOSE BLDC GLUCOMTR-MCNC: 103 MG/DL — HIGH (ref 70–99)
GLUCOSE BLDC GLUCOMTR-MCNC: 155 MG/DL — HIGH (ref 70–99)
GLUCOSE BLDC GLUCOMTR-MCNC: 171 MG/DL — HIGH (ref 70–99)
GLUCOSE SERPL-MCNC: 214 MG/DL — HIGH (ref 70–99)
HCT VFR BLD CALC: 35.4 % — SIGNIFICANT CHANGE UP (ref 34.5–45)
HGB BLD-MCNC: 10.8 G/DL — LOW (ref 11.5–15.5)
MAGNESIUM SERPL-MCNC: 2 MG/DL — SIGNIFICANT CHANGE UP (ref 1.6–2.6)
MCHC RBC-ENTMCNC: 25.4 PG — LOW (ref 27–34)
MCHC RBC-ENTMCNC: 30.5 % — LOW (ref 32–36)
MCV RBC AUTO: 83.1 FL — SIGNIFICANT CHANGE UP (ref 80–100)
NRBC # FLD: 0 K/UL — SIGNIFICANT CHANGE UP (ref 0–0)
PHOSPHATE SERPL-MCNC: 3.5 MG/DL — SIGNIFICANT CHANGE UP (ref 2.5–4.5)
PLATELET # BLD AUTO: 207 K/UL — SIGNIFICANT CHANGE UP (ref 150–400)
PMV BLD: 12.1 FL — SIGNIFICANT CHANGE UP (ref 7–13)
POTASSIUM SERPL-MCNC: 3.2 MMOL/L — LOW (ref 3.5–5.3)
POTASSIUM SERPL-SCNC: 3.2 MMOL/L — LOW (ref 3.5–5.3)
RBC # BLD: 4.26 M/UL — SIGNIFICANT CHANGE UP (ref 3.8–5.2)
RBC # FLD: 15.7 % — HIGH (ref 10.3–14.5)
SODIUM SERPL-SCNC: 141 MMOL/L — SIGNIFICANT CHANGE UP (ref 135–145)
WBC # BLD: 5.32 K/UL — SIGNIFICANT CHANGE UP (ref 3.8–10.5)
WBC # FLD AUTO: 5.32 K/UL — SIGNIFICANT CHANGE UP (ref 3.8–10.5)

## 2020-10-22 PROCEDURE — 99233 SBSQ HOSP IP/OBS HIGH 50: CPT

## 2020-10-22 RX ORDER — SACUBITRIL AND VALSARTAN 24; 26 MG/1; MG/1
1 TABLET, FILM COATED ORAL
Refills: 0 | Status: DISCONTINUED | OUTPATIENT
Start: 2020-10-23 | End: 2020-10-23

## 2020-10-22 RX ORDER — CARVEDILOL PHOSPHATE 80 MG/1
6.25 CAPSULE, EXTENDED RELEASE ORAL EVERY 12 HOURS
Refills: 0 | Status: DISCONTINUED | OUTPATIENT
Start: 2020-10-22 | End: 2020-10-23

## 2020-10-22 RX ORDER — POTASSIUM CHLORIDE 20 MEQ
20 PACKET (EA) ORAL
Refills: 0 | Status: COMPLETED | OUTPATIENT
Start: 2020-10-22 | End: 2020-10-22

## 2020-10-22 RX ADMIN — CARVEDILOL PHOSPHATE 6.25 MILLIGRAM(S): 80 CAPSULE, EXTENDED RELEASE ORAL at 18:06

## 2020-10-22 RX ADMIN — Medication 1: at 09:36

## 2020-10-22 RX ADMIN — ATORVASTATIN CALCIUM 40 MILLIGRAM(S): 80 TABLET, FILM COATED ORAL at 21:15

## 2020-10-22 RX ADMIN — Medication 20 MILLIEQUIVALENT(S): at 15:44

## 2020-10-22 RX ADMIN — Medication 20 MILLIEQUIVALENT(S): at 13:19

## 2020-10-22 RX ADMIN — Medication 40 MILLIGRAM(S): at 05:57

## 2020-10-22 RX ADMIN — ENOXAPARIN SODIUM 40 MILLIGRAM(S): 100 INJECTION SUBCUTANEOUS at 09:33

## 2020-10-22 RX ADMIN — Medication 1: at 18:00

## 2020-10-22 RX ADMIN — Medication 20 MILLIEQUIVALENT(S): at 18:06

## 2020-10-22 RX ADMIN — Medication 40 MILLIGRAM(S): at 18:06

## 2020-10-22 RX ADMIN — Medication 81 MILLIGRAM(S): at 09:33

## 2020-10-22 RX ADMIN — LOSARTAN POTASSIUM 50 MILLIGRAM(S): 100 TABLET, FILM COATED ORAL at 09:33

## 2020-10-22 NOTE — PROGRESS NOTE ADULT - PROBLEM SELECTOR PLAN 1
-h/o dilated cardiomyopathy   -proBNP 8155  -TTE showed EF of 12% , severe MR, moderate to severe TR, severe pulm HTN   -CXR w/ trace right pleural effusion. lungs clear  -diuresis w/ Lasix 40mg IV BID   -increase Coreg to 6.25mg BID   -d/c Losartan and start Entresto   -Daily weight, Strict I/O, Low sodium DASH diet  -Monitor electrolytes   -Cardiology following -acute on chronic systolic heart failure, h/o dilated cardiomyopathy   -proBNP 8155  -TTE showed EF of 12% , severe MR, moderate to severe TR, severe pulm HTN   -CXR w/ trace right pleural effusion. lungs clear  -diuresis w/ Lasix 40mg IV BID   -increase Coreg to 6.25mg BID   -d/c Losartan and start Entresto   -Daily weight, Strict I/O, Low sodium DASH diet  -Monitor electrolytes   -Cardiology following

## 2020-10-22 NOTE — PROGRESS NOTE ADULT - ASSESSMENT
Patient is a 70 year old woman with HFrEF, dilated cardiomyopathy (LVEF 25%) with severe functional MR, hypertension, DM2 presents with worsening chest pain and shortness of breath x3 weeks.Intermittent sided chest pain radiating to L arm, exacerbated with activity/ exertion, improves with rest.  Currently chest pain free. She reports dyspnea on exertion, unable to perform daily activities due to increased fatigue and dizziness. She reports poor po intake, though often feels "bloated and gassy", denies n/v/d. She endorsed orthopnea,> 2 pillows when sleeping.  On exam by the admitting Medicine team, the patient was orthopneic.  She was started on IV furosemide with marked improvement in symptoms.    At the time of my examination, the patient appeared clinically and hemodynamically stable.  VSS.  She was on O2 NC at 2L with good O2 saturation.  Physical examination was notable for coarse breath sounds bilaterally.  No LE edema.    At this time, recommend:  1. Continue monitoring on telemetry -- no interval events noted  2. Continue IV diuresis with furosemide (currently on 40 mg IV BID). Strict I/Os, daily standing weights, fluid restriction to 1.5 L/24 hrs.    3, Continue Coreg 3.125 BID. Can advance dose to 6.25 BID.  4. Stop Losartan and start Entresto as per protocol.  5. Continue low dose daily aspirin

## 2020-10-22 NOTE — PROGRESS NOTE ADULT - PROBLEM SELECTOR PLAN 2
-likely 2/2 ADHF, last [facility] 5/20 w/ no significant CAD noted  -HsT 13>>11. neg delta. EKG unchanged from previous  -continue ASA and statin

## 2020-10-22 NOTE — PROGRESS NOTE ADULT - SUBJECTIVE AND OBJECTIVE BOX
Cardiology/Vascular Medicine Inpatient Progress Note    Feels better this AM -- seen this AM laying flat in bed  Denies current CP, SOB, palpitations  Still using NC overnight  Good urine output overnight ~-1500 cc    Vital Signs Last 24 Hrs  T(C): 36.4 (22 Oct 2020 05:53), Max: 36.8 (21 Oct 2020 10:41)  T(F): 97.6 (22 Oct 2020 05:53), Max: 98.3 (21 Oct 2020 10:41)  HR: 70 (22 Oct 2020 05:53) (68 - 78)  BP: 115/76 (22 Oct 2020 05:53) (115/76 - 158/78)  BP(mean): --  RR: 17 (22 Oct 2020 05:53) (15 - 17)  SpO2: 100% (22 Oct 2020 05:53) (98% - 100%)    I&O's Detail    21 Oct 2020 07:01  -  22 Oct 2020 07:00  --------------------------------------------------------  IN:    Oral Fluid: 340 mL  Total IN: 340 mL    OUT:    Incontinent per Collection Bag (mL): 1500 mL  Total OUT: 1500 mL    Total NET: -1160 mL      22 Oct 2020 07:01  -  22 Oct 2020 08:32  --------------------------------------------------------  IN:  Total IN: 0 mL    OUT:    Voided (mL): 1500 mL  Total OUT: 1500 mL    Total NET: -1500 mL    Appearance: NAD, seen eating lunch, not wearing NC  HEENT: Mild JVD  Cardiovascular: Normal S1 S2, No JVD, No murmurs, No edema  Respiratory: Decreased breath sounds bilaterally  Psychiatry: Awake, alert, Creole-speaking  Gastrointestinal:  Soft, Non-tender, + BS	  Neurologic: Non-focal  Extremities: No LE edema    MEDICATIONS  (STANDING):  aspirin enteric coated 81 milliGRAM(s) Oral daily  atorvastatin 40 milliGRAM(s) Oral at bedtime  carvedilol 3.125 milliGRAM(s) Oral every 12 hours  dextrose 5%. 1000 milliLiter(s) (50 mL/Hr) IV Continuous <Continuous>  dextrose 50% Injectable 12.5 Gram(s) IV Push once  dextrose 50% Injectable 25 Gram(s) IV Push once  dextrose 50% Injectable 25 Gram(s) IV Push once  enoxaparin Injectable 40 milliGRAM(s) SubCutaneous daily  furosemide   Injectable 40 milliGRAM(s) IV Push two times a day  insulin lispro (ADMELOG) corrective regimen sliding scale   SubCutaneous three times a day before meals  insulin lispro (ADMELOG) corrective regimen sliding scale   SubCutaneous at bedtime  losartan 50 milliGRAM(s) Oral daily    MEDICATIONS  (PRN):  dextrose 40% Gel 15 Gram(s) Oral once PRN Blood Glucose LESS THAN 70 milliGRAM(s)/deciliter  glucagon  Injectable 1 milliGRAM(s) IntraMuscular once PRN Glucose LESS THAN 70 milligrams/deciliter        LABS:	 	                          10.8   5.32  )-----------( 207      ( 22 Oct 2020 06:18 )             35.4   10-22    141  |  105  |  19  ----------------------------<  214<H>  3.2<L>   |  25  |  0.68    Ca    8.5      22 Oct 2020 06:18  Phos  3.5     10-22  Mg     2.0     10-22    TPro  7.1  /  Alb  4.1  /  TBili  0.8  /  DBili  x   /  AST  43<H>  /  ALT  31  /  AlkPhos  116  10-20                          proBNP: Serum Pro-Brain Natriuretic Peptide: 8155 pg/mL (10-20-20 @ 19:55)    < from: Xray Chest 2 Views PA/Lat (10.20.20 @ 20:33) >  EXAM:  XR CHEST PA LAT 2V        PROCEDURE DATE:  Oct 20 2020         INTERPRETATION:  CLINICAL INDICATION: Shortness of breath    TECHNIQUE: 2 views of the chest was obtained.    COMPARISON: None.    FINDINGS:    Lungs are clear. There is a trace right pleural effusion.  Cardiac size is enlarged and stable. No acute osseous finding.    IMPRESSION:  Clear lungs. Cardiomegaly            LUISA NORIEGA M.D., RADIOLOGY RESIDENT  This document has been electronically signed.  LAZARO JORDAN M.D., ATTENDINGRADIOLOGIST  This document has been electronically signed. Oct 21 2020  8:53AM    < end of copied text >  < from: Transthoracic Echocardiogram (10.21.20 @ 06:50) >    Patient name: LALY MAKI  YOB: 1950   Age: 70 (F)   MR#: [id number]  Study Date: 10/21/2020  Location: O/PSonographer: JASPAL Tan  Study quality: Technically good  Referring Physician: Not Available Doctor, MD  Blood Pressure: 138/85 mmHg  Height: 168 cm  Weight: 70 kg  BSA: 1.8 m2  ------------------------------------------------------------------------  PROCEDURE: Transthoracic echocardiogram with 2-D, M-Mode  and complete spectral and color flow Doppler.  INDICATION: Chest pain, unspecified (R07.9)  ------------------------------------------------------------------------  DIMENSIONS:  Dimensions:     Normal Values:  LA:     4.4 cm    2.0 - 4.0 cm  Ao:     3.6 cm    2.0 - 3.8 cm  SEPTUM: 0.8 cm    0.6 - 1.2 cm  PWT:    0.8 cm    0.6 - 1.1 cm  LVIDd:  7.1 cm    3.0 - 5.6 cm  LVIDs:  6.7 cm    1.8 - 4.0 cm  Derived Variables:  LVMI: 140 g/m2  RWT: 0.22  Fractional short: 6 %  Ejection Fraction (Philip): 12 %  ------------------------------------------------------------------------  OBSERVATIONS:  Mitral Valve: Mitral annular calcification, otherwise  normal mitral valve. Severe mitral regurgitation.  Aortic Root: Normal aortic root.  Aortic Valve: Calcified trileaflet aortic valve with normal  opening.Mild aortic regurgitation.  Left Atrium: Severely dilated left atrium.  LA volume index  = 73 cc/m2.  Left Ventricle: Severe global left ventricular systolic  dysfunction. Severe left ventricular enlargement.  Right Heart: Mild right atrial enlargement. Normal right  ventricular size with decreased right ventricular systolic  function. Normal tricuspid valve.  Moderate-severe  tricuspid regurgitation. Normal pulmonic valve. Mild  pulmonic regurgitation.  Pericardium/PleuraSmall pericardial effusion superior to  the right atrium.  Hemodynamic: Estimated right ventricular systolic pressure  equals 65 mm Hg, assuming right atrial pressure equals 10  mm Hg, consistent with severe pulmonary hypertension.  ------------------------------------------------------------------------  CONCLUSIONS:  1. Mitral annular calcification, otherwise normal mitral  valve. Severe mitral regurgitation.  2. Calcified trileaflet aortic valve with normal opening.  Mild aortic regurgitation.  3. Severely dilated left atrium.  LA volume index = 73  cc/m2.  4. Severe left ventricular enlargement.  5. Severe global left ventricular systolic dysfunction.  6. Normal right ventricular size with decreased right  ventricular systolic function.  7. Estimated right ventricular systolic pressure equals 65  mm Hg, assuming right atrial pressure equals 10 mm Hg,  consistent with severe pulmonary hypertension.  8. Normal tricuspid valve.  Moderate-severe tricuspid  regurgitation.  *** Compared with echocardiogram of 5/14/2020, more mitral  regurgitation is noted on the current study.  ------------------------------------------------------------------------  Confirmed on  10/21/2020 - 08:35:30 by Ernie Johnson M.D.  ------------------------------------------------------------------------    < end of copied text >

## 2020-10-22 NOTE — PROGRESS NOTE ADULT - SUBJECTIVE AND OBJECTIVE BOX
PROGRESS NOTE:     Patient is a 70y old  Female who presents with a chief complaint of SOB, ADHF (22 Oct 2020 08:30)      SUBJECTIVE / OVERNIGHT EVENTS: Shortness of breath improving.     ADDITIONAL REVIEW OF SYSTEMS:    MEDICATIONS  (STANDING):  aspirin enteric coated 81 milliGRAM(s) Oral daily  atorvastatin 40 milliGRAM(s) Oral at bedtime  carvedilol 3.125 milliGRAM(s) Oral every 12 hours  dextrose 5%. 1000 milliLiter(s) (50 mL/Hr) IV Continuous <Continuous>  dextrose 50% Injectable 12.5 Gram(s) IV Push once  dextrose 50% Injectable 25 Gram(s) IV Push once  dextrose 50% Injectable 25 Gram(s) IV Push once  enoxaparin Injectable 40 milliGRAM(s) SubCutaneous daily  furosemide   Injectable 40 milliGRAM(s) IV Push two times a day  insulin lispro (ADMELOG) corrective regimen sliding scale   SubCutaneous three times a day before meals  insulin lispro (ADMELOG) corrective regimen sliding scale   SubCutaneous at bedtime  losartan 50 milliGRAM(s) Oral daily    MEDICATIONS  (PRN):  dextrose 40% Gel 15 Gram(s) Oral once PRN Blood Glucose LESS THAN 70 milliGRAM(s)/deciliter  glucagon  Injectable 1 milliGRAM(s) IntraMuscular once PRN Glucose LESS THAN 70 milligrams/deciliter      CAPILLARY BLOOD GLUCOSE      POCT Blood Glucose.: 158 mg/dL (22 Oct 2020 08:32)  POCT Blood Glucose.: 165 mg/dL (21 Oct 2020 21:09)  POCT Blood Glucose.: 124 mg/dL (21 Oct 2020 17:32)  POCT Blood Glucose.: 160 mg/dL (21 Oct 2020 12:32)    I&O's Summary    21 Oct 2020 07:01  -  22 Oct 2020 07:00  --------------------------------------------------------  IN: 340 mL / OUT: 1500 mL / NET: -1160 mL    22 Oct 2020 07:01  -  22 Oct 2020 12:15  --------------------------------------------------------  IN: 0 mL / OUT: 1500 mL / NET: -1500 mL        PHYSICAL EXAM:  Vital Signs Last 24 Hrs  T(C): 36.3 (22 Oct 2020 12:09), Max: 36.7 (22 Oct 2020 04:20)  T(F): 97.3 (22 Oct 2020 12:09), Max: 98 (22 Oct 2020 04:20)  HR: 61 (22 Oct 2020 12:09) (61 - 78)  BP: 129/86 (22 Oct 2020 12:09) (115/76 - 158/78)  BP(mean): --  RR: 18 (22 Oct 2020 12:09) (16 - 18)  SpO2: 100% (22 Oct 2020 12:09) (100% - 100%)    CONSTITUTIONAL: NAD, well-developed  NECK: Mild JVD, supple  RESPIRATORY: Normal respiratory effort; decreased breath sounds   CARDIOVASCULAR: Regular rate and rhythm, normal S1 and S2, no murmur/rub/gallop; No lower extremity edema; Peripheral pulses are 2+ bilaterally  ABDOMEN: Nontender to palpation, normoactive bowel sounds, no rebound/guarding; No hepatosplenomegaly  MUSCLOSKELETAL: no clubbing or cyanosis of digits; no joint swelling or tenderness to palpation  PSYCH: A+O to person, place, and time; affect appropriate    LABS:                        10.8   5.32  )-----------( 207      ( 22 Oct 2020 06:18 )             35.4     10-22    141  |  105  |  19  ----------------------------<  214<H>  3.2<L>   |  25  |  0.68    Ca    8.5      22 Oct 2020 06:18  Phos  3.5     10-22  Mg     2.0     10-22    TPro  7.1  /  Alb  4.1  /  TBili  0.8  /  DBili  x   /  AST  43<H>  /  ALT  31  /  AlkPhos  116  10-20                RADIOLOGY & ADDITIONAL TESTS:  Results Reviewed:   Imaging Personally Reviewed:  Electrocardiogram Personally Reviewed:  TTE:  1. Mitral annular calcification, otherwise normal mitral  valve. Severe mitral regurgitation.  2. Calcified trileaflet aortic valve with normal opening.  Mild aortic regurgitation.  3. Severely dilated left atrium.  LA volume index = 73  cc/m2.  4. Severe left ventricular enlargement.  5. Severe global left ventricular systolic dysfunction.  6. Normal right ventricular size with decreased right  ventricular systolic function.  7. Estimated right ventricular systolic pressure equals 65  mm Hg, assuming right atrial pressure equals 10 mm Hg,  consistent with severe pulmonary hypertension.  8. Normal tricuspid valve.  Moderate-severe tricuspid  regurgitation.    COORDINATION OF CARE:  Care Discussed with Consultants/Other Providers [Y/N]:  Prior or Outpatient Records Reviewed [Y/N]:

## 2020-10-22 NOTE — PROGRESS NOTE ADULT - ASSESSMENT
70 year old female with PMHx of HFrEF, dilated cardiomyopathy (EF 25%) with severe functional MR, hypertension, DM2 presents with worsening chest pain and shortness of breath x3 weeks d/t CHF.

## 2020-10-23 ENCOUNTER — TRANSCRIPTION ENCOUNTER (OUTPATIENT)
Age: 70
End: 2020-10-23

## 2020-10-23 VITALS
SYSTOLIC BLOOD PRESSURE: 121 MMHG | RESPIRATION RATE: 16 BRPM | HEART RATE: 60 BPM | OXYGEN SATURATION: 100 % | TEMPERATURE: 98 F | DIASTOLIC BLOOD PRESSURE: 60 MMHG

## 2020-10-23 LAB
GLUCOSE BLDC GLUCOMTR-MCNC: 134 MG/DL — HIGH (ref 70–99)
GLUCOSE BLDC GLUCOMTR-MCNC: 144 MG/DL — HIGH (ref 70–99)
GLUCOSE BLDC GLUCOMTR-MCNC: 185 MG/DL — HIGH (ref 70–99)

## 2020-10-23 PROCEDURE — 99232 SBSQ HOSP IP/OBS MODERATE 35: CPT

## 2020-10-23 PROCEDURE — 99239 HOSP IP/OBS DSCHRG MGMT >30: CPT

## 2020-10-23 RX ORDER — ATORVASTATIN CALCIUM 80 MG/1
1 TABLET, FILM COATED ORAL
Qty: 0 | Refills: 0 | DISCHARGE
Start: 2020-10-23

## 2020-10-23 RX ORDER — FUROSEMIDE 40 MG
1 TABLET ORAL
Qty: 60 | Refills: 0
Start: 2020-10-23 | End: 2020-11-21

## 2020-10-23 RX ORDER — METFORMIN HYDROCHLORIDE 850 MG/1
1 TABLET ORAL
Qty: 0 | Refills: 0 | DISCHARGE

## 2020-10-23 RX ORDER — CARVEDILOL PHOSPHATE 80 MG/1
1 CAPSULE, EXTENDED RELEASE ORAL
Qty: 0 | Refills: 0 | DISCHARGE
Start: 2020-10-23

## 2020-10-23 RX ORDER — SACUBITRIL AND VALSARTAN 24; 26 MG/1; MG/1
1 TABLET, FILM COATED ORAL
Qty: 60 | Refills: 0
Start: 2020-10-23 | End: 2020-11-21

## 2020-10-23 RX ORDER — ASPIRIN/CALCIUM CARB/MAGNESIUM 324 MG
1 TABLET ORAL
Qty: 0 | Refills: 0 | DISCHARGE
Start: 2020-10-23

## 2020-10-23 RX ORDER — ASPIRIN/CALCIUM CARB/MAGNESIUM 324 MG
1 TABLET ORAL
Qty: 0 | Refills: 0 | DISCHARGE

## 2020-10-23 RX ORDER — FUROSEMIDE 40 MG
40 TABLET ORAL EVERY 12 HOURS
Refills: 0 | Status: DISCONTINUED | OUTPATIENT
Start: 2020-10-23 | End: 2020-10-23

## 2020-10-23 RX ADMIN — Medication 81 MILLIGRAM(S): at 11:43

## 2020-10-23 RX ADMIN — CARVEDILOL PHOSPHATE 6.25 MILLIGRAM(S): 80 CAPSULE, EXTENDED RELEASE ORAL at 17:27

## 2020-10-23 RX ADMIN — CARVEDILOL PHOSPHATE 6.25 MILLIGRAM(S): 80 CAPSULE, EXTENDED RELEASE ORAL at 05:49

## 2020-10-23 RX ADMIN — SACUBITRIL AND VALSARTAN 1 TABLET(S): 24; 26 TABLET, FILM COATED ORAL at 17:49

## 2020-10-23 RX ADMIN — Medication 40 MILLIGRAM(S): at 17:27

## 2020-10-23 RX ADMIN — SACUBITRIL AND VALSARTAN 1 TABLET(S): 24; 26 TABLET, FILM COATED ORAL at 05:49

## 2020-10-23 RX ADMIN — ENOXAPARIN SODIUM 40 MILLIGRAM(S): 100 INJECTION SUBCUTANEOUS at 11:43

## 2020-10-23 RX ADMIN — Medication 1: at 12:15

## 2020-10-23 RX ADMIN — Medication 40 MILLIGRAM(S): at 05:50

## 2020-10-23 NOTE — PROGRESS NOTE ADULT - PROBLEM SELECTOR PROBLEM 3
Dilated cardiomyopathy
Dilated cardiomyopathy
Hypertension, unspecified type
Hypertension, unspecified type

## 2020-10-23 NOTE — DISCHARGE NOTE PROVIDER - HOSPITAL COURSE
70 year old female with PMHx of HFrEF, dilated cardiomyopathy (EF 25%) with severe functional MR, hypertension, DM2 presents with worsening chest pain and shortness of breath x3 weeks.    . chest pain - associated with SOB/SUBRAMANIAN,   HFrEF 12%- lasix 40mg bid, net 1.5L,   - last [facility] 5/20, no CAD noted, on asa and lipitor  - HsT 13>>11. neg delta. EKG   - TTE showed worsening EF and MR  - covid pcr neg  -CXR w/ trace right pleural effusion. lungs clear  Transitioned to PO lasix on 10/23 40mg oral twice daily . Patient stable for discharge home on oral diuretic. Follow up with cardiology or tele health in 1 week     2. HFrEF  proBNP 8155  c/w coreg, losartan  diuresed with IV lasix     3.Dilated cardiomyopathy c/b severe MR  -c/w carvedilol 3.125 BID   -c/w Losartan 50mg   -lasix 40mg IVP BID      3. DM2  hgb1ac 6.8  ISS    4. HTN  c/w carvedilol 3.125 BID   -c/w Losartan 50mg     5.DVT ppx lovenox     70 year old female with PMHx of HFrEF, dilated cardiomyopathy (EF 25%) with severe functional MR, hypertension, DM2 presents with worsening chest pain and shortness of breath x3 weeks.    . chest pain - associated with SOB/SUBRAMANIAN,   HFrEF 12%- lasix 40mg bid, net 1.5L,   - last [facility] 5/20, no CAD noted, on asa and lipitor  - HsT 13>>11. neg delta. EKG   - TTE showed worsening EF and MR  - covid pcr neg  -CXR w/ trace right pleural effusion. lungs clear  Transitioned to PO lasix on 10/23 40mg oral twice daily . Patient stable for discharge home on oral diuretic. Follow up with cardiology or tele health in 1 week     2. HFrEF  proBNP 8155  c/w coreg, losartan  diuresed with IV lasix     3.Dilated cardiomyopathy c/b severe MR  -c/w carvedilol 3.125 BID   -c/w Losartan 50mg   -diuresed with IV lasix . Stable for discharge home with outpt follow up in 1 week       3. DM2  hgb1ac 6.8  ISS    4. HTN  c/w carvedilol 3.125 BID   -c/w Losartan 50mg     5.DVT ppx lovenox

## 2020-10-23 NOTE — CHART NOTE - NSCHARTNOTEFT_GEN_A_CORE
discussed with [employer]/ pt insurance company. Patient has 3 months to obtain prior auth by attending physician . Able to obtain entresto if deductible paid. Total due today $380. next month and thereafter 47 dollars. Spoke to daughter denny . In agreement with paying 380 dollars today. Advised to follow up with Dr. Sommer in 1 week . Appointment in discharge papers

## 2020-10-23 NOTE — DISCHARGE NOTE PROVIDER - NSDCMRMEDTOKEN_GEN_ALL_CORE_FT
Aspirin Enteric Coated 81 mg oral delayed release tablet: 1 tab(s) orally once a day  atorvastatin 40 mg oral tablet: 1 tab(s) orally once a day (at bedtime)  carvedilol 3.125 mg oral tablet: 1 tab(s) orally every 12 hours  furosemide 20 mg oral tablet: 1 tab(s) orally once a day MDD:1  losartan 50 mg oral tablet: 1 tab(s) orally once a day  metFORMIN 500 mg oral tablet: 1 tab(s) orally 2 times a day   aspirin 81 mg oral delayed release tablet: 1 tab(s) orally once a day  atorvastatin 40 mg oral tablet: 1 tab(s) orally once a day (at bedtime)  carvedilol 6.25 mg oral tablet: 1 tab(s) orally every 12 hours  furosemide 40 mg oral tablet: 1 tab(s) orally every 12 hours  metFORMIN 500 mg oral tablet: 1 tab(s) orally 2 times a day DO NOT RESTART UNTIL 10/26   sacubitril-valsartan 24 mg-26 mg oral tablet: 1 tab(s) orally 2 times a day

## 2020-10-23 NOTE — PROGRESS NOTE ADULT - SUBJECTIVE AND OBJECTIVE BOX
PROGRESS NOTE:     Patient is a 70y old  Female who presents with a chief complaint of shortness of breath 		 (23 Oct 2020 10:27)      SUBJECTIVE / OVERNIGHT EVENTS: Feels better, shortness of breath improved.     ADDITIONAL REVIEW OF SYSTEMS:    MEDICATIONS  (STANDING):  aspirin enteric coated 81 milliGRAM(s) Oral daily  atorvastatin 40 milliGRAM(s) Oral at bedtime  carvedilol 6.25 milliGRAM(s) Oral every 12 hours  dextrose 5%. 1000 milliLiter(s) (50 mL/Hr) IV Continuous <Continuous>  dextrose 50% Injectable 12.5 Gram(s) IV Push once  dextrose 50% Injectable 25 Gram(s) IV Push once  dextrose 50% Injectable 25 Gram(s) IV Push once  enoxaparin Injectable 40 milliGRAM(s) SubCutaneous daily  furosemide    Tablet 40 milliGRAM(s) Oral every 12 hours  insulin lispro (ADMELOG) corrective regimen sliding scale   SubCutaneous three times a day before meals  insulin lispro (ADMELOG) corrective regimen sliding scale   SubCutaneous at bedtime  sacubitril 24 mG/valsartan 26 mG 1 Tablet(s) Oral two times a day    MEDICATIONS  (PRN):  dextrose 40% Gel 15 Gram(s) Oral once PRN Blood Glucose LESS THAN 70 milliGRAM(s)/deciliter  glucagon  Injectable 1 milliGRAM(s) IntraMuscular once PRN Glucose LESS THAN 70 milligrams/deciliter      CAPILLARY BLOOD GLUCOSE      POCT Blood Glucose.: 185 mg/dL (23 Oct 2020 12:12)  POCT Blood Glucose.: 134 mg/dL (23 Oct 2020 08:37)  POCT Blood Glucose.: 171 mg/dL (22 Oct 2020 22:17)  POCT Blood Glucose.: 155 mg/dL (22 Oct 2020 17:06)    I&O's Summary    22 Oct 2020 07:01  -  23 Oct 2020 07:00  --------------------------------------------------------  IN: 420 mL / OUT: 4000 mL / NET: -3580 mL        PHYSICAL EXAM:  Vital Signs Last 24 Hrs  T(C): 36.7 (23 Oct 2020 11:44), Max: 36.7 (22 Oct 2020 17:50)  T(F): 98 (23 Oct 2020 11:44), Max: 98 (22 Oct 2020 17:50)  HR: 62 (23 Oct 2020 11:44) (55 - 62)  BP: 123/64 (23 Oct 2020 11:44) (114/70 - 134/84)  BP(mean): --  RR: 17 (23 Oct 2020 11:44) (16 - 17)  SpO2: 98% (23 Oct 2020 11:44) (98% - 100%)    CONSTITUTIONAL: NAD, well-developed  NECK: No JVD, supple  RESPIRATORY: Normal respiratory effort; decreased breath sounds   CARDIOVASCULAR: Regular rate and rhythm, normal S1 and S2, no murmur/rub/gallop; No lower extremity edema; Peripheral pulses are 2+ bilaterally  ABDOMEN: Nontender to palpation, normoactive bowel sounds, no rebound/guarding; No hepatosplenomegaly  MUSCLOSKELETAL: no clubbing or cyanosis of digits; no joint swelling or tenderness to palpation  PSYCH: A+O to person, place, and time; affect appropriate    LABS:                        10.8   5.32  )-----------( 207      ( 22 Oct 2020 06:18 )             35.4     10-22    141  |  105  |  19  ----------------------------<  214<H>  3.2<L>   |  25  |  0.68    Ca    8.5      22 Oct 2020 06:18  Phos  3.5     10-22  Mg     2.0     10-22                  RADIOLOGY & ADDITIONAL TESTS:  Results Reviewed:   Imaging Personally Reviewed:  Electrocardiogram Personally Reviewed:    COORDINATION OF CARE:  Care Discussed with Consultants/Other Providers [Y/N]:  Prior or Outpatient Records Reviewed [Y/N]:

## 2020-10-23 NOTE — DISCHARGE NOTE PROVIDER - CARE PROVIDER_API CALL
Ambrocio Sommer  CARDIOLOGY  [address] [address] [place] [address], [place] [address]  Phone: [phone]  Fax: [phone]  Scheduled Appointment: 10/29/2020 01:15 PM

## 2020-10-23 NOTE — PROGRESS NOTE ADULT - SUBJECTIVE AND OBJECTIVE BOX
Cardiology/Vascular Medicine Inpatient Progress Note    Feels better this AM -- seen this AM laying flat in bed  Denies current CP, SOB, palpitations  Still using NC overnight  Good urine output overnight ~-3580 cc    Vital Signs Last 24 Hrs  T(C): 36.6 (23 Oct 2020 05:45), Max: 36.7 (22 Oct 2020 17:50)  T(F): 97.9 (23 Oct 2020 05:45), Max: 98 (22 Oct 2020 17:50)  HR: 58 (23 Oct 2020 05:45) (55 - 61)  BP: 134/84 (23 Oct 2020 05:45) (114/70 - 134/84)  BP(mean): --  RR: 16 (23 Oct 2020 05:45) (16 - 18)  SpO2: 100% (23 Oct 2020 05:45) (98% - 100%)    I&O's Detail    22 Oct 2020 07:01  -  23 Oct 2020 07:00  --------------------------------------------------------  IN:    Oral Fluid: 420 mL  Total IN: 420 mL    OUT:    Incontinent per Collection Bag (mL): 1300 mL    Voided (mL): 2700 mL  Total OUT: 4000 mL    Total NET: -3580 mL    Appearance: NAD, laying flat in bed, wearing NC  HEENT: No appreciable JVD  Cardiovascular: Normal S1 S2, No JVD, 3/6 SM  Respiratory: Decreased breath sounds bilaterally  Psychiatry: Awake, alert, Creole-speaking  Gastrointestinal:  Soft, Non-tender, + BS	  Neurologic: Non-focal  Extremities: No LE edema    MEDICATIONS  (STANDING):  aspirin enteric coated 81 milliGRAM(s) Oral daily  atorvastatin 40 milliGRAM(s) Oral at bedtime  carvedilol 6.25 milliGRAM(s) Oral every 12 hours  dextrose 5%. 1000 milliLiter(s) (50 mL/Hr) IV Continuous <Continuous>  dextrose 50% Injectable 12.5 Gram(s) IV Push once  dextrose 50% Injectable 25 Gram(s) IV Push once  dextrose 50% Injectable 25 Gram(s) IV Push once  enoxaparin Injectable 40 milliGRAM(s) SubCutaneous daily  furosemide   Injectable 40 milliGRAM(s) IV Push two times a day  insulin lispro (ADMELOG) corrective regimen sliding scale   SubCutaneous three times a day before meals  insulin lispro (ADMELOG) corrective regimen sliding scale   SubCutaneous at bedtime  sacubitril 24 mG/valsartan 26 mG 1 Tablet(s) Oral two times a day    MEDICATIONS  (PRN):  dextrose 40% Gel 15 Gram(s) Oral once PRN Blood Glucose LESS THAN 70 milliGRAM(s)/deciliter  glucagon  Injectable 1 milliGRAM(s) IntraMuscular once PRN Glucose LESS THAN 70 milligrams/deciliter        LABS:	 	                                   10.8   5.32  )-----------( 207      ( 22 Oct 2020 06:18 )             35.4   10-22    141  |  105  |  19  ----------------------------<  214<H>  3.2<L>   |  25  |  0.68    Ca    8.5      22 Oct 2020 06:18  Phos  3.5     10-22  Mg     2.0     10-22                       proBNP: Serum Pro-Brain Natriuretic Peptide: 8155 pg/mL (10-20-20 @ 19:55)    < from: Xray Chest 2 Views PA/Lat (10.20.20 @ 20:33) >  EXAM:  XR CHEST PA LAT 2V        PROCEDURE DATE:  Oct 20 2020         INTERPRETATION:  CLINICAL INDICATION: Shortness of breath    TECHNIQUE: 2 views of the chest was obtained.    COMPARISON: None.    FINDINGS:    Lungs are clear. There is a trace right pleural effusion.  Cardiac size is enlarged and stable. No acute osseous finding.    IMPRESSION:  Clear lungs. Cardiomegaly            LUISA NORIEGA M.D., RADIOLOGY RESIDENT  This document has been electronically signed.  LAZARO JORDAN M.D., ATTENDINGRADIOLOGIST  This document has been electronically signed. Oct 21 2020  8:53AM    < end of copied text >  < from: Transthoracic Echocardiogram (10.21.20 @ 06:50) >    Patient name: LALY MAKI  YOB: 1950   Age: 70 (F)   MR#: [id number]  Study Date: 10/21/2020  Location: O/PSonographer: JASPAL Tan  Study quality: Technically good  Referring Physician: Not Available Doctor, MD  Blood Pressure: 138/85 mmHg  Height: 168 cm  Weight: 70 kg  BSA: 1.8 m2  ------------------------------------------------------------------------  PROCEDURE: Transthoracic echocardiogram with 2-D, M-Mode  and complete spectral and color flow Doppler.  INDICATION: Chest pain, unspecified (R07.9)  ------------------------------------------------------------------------  DIMENSIONS:  Dimensions:     Normal Values:  LA:     4.4 cm    2.0 - 4.0 cm  Ao:     3.6 cm    2.0 - 3.8 cm  SEPTUM: 0.8 cm    0.6 - 1.2 cm  PWT:    0.8 cm    0.6 - 1.1 cm  LVIDd:  7.1 cm    3.0 - 5.6 cm  LVIDs:  6.7 cm    1.8 - 4.0 cm  Derived Variables:  LVMI: 140 g/m2  RWT: 0.22  Fractional short: 6 %  Ejection Fraction (Teicholtz): 12 %  ------------------------------------------------------------------------  OBSERVATIONS:  Mitral Valve: Mitral annular calcification, otherwise  normal mitral valve. Severe mitral regurgitation.  Aortic Root: Normal aortic root.  Aortic Valve: Calcified trileaflet aortic valve with normal  opening.Mild aortic regurgitation.  Left Atrium: Severely dilated left atrium.  LA volume index  = 73 cc/m2.  Left Ventricle: Severe global left ventricular systolic  dysfunction. Severe left ventricular enlargement.  Right Heart: Mild right atrial enlargement. Normal right  ventricular size with decreased right ventricular systolic  function. Normal tricuspid valve.  Moderate-severe  tricuspid regurgitation. Normal pulmonic valve. Mild  pulmonic regurgitation.  Pericardium/PleuraSmall pericardial effusion superior to  the right atrium.  Hemodynamic: Estimated right ventricular systolic pressure  equals 65 mm Hg, assuming right atrial pressure equals 10  mm Hg, consistent with severe pulmonary hypertension.  ------------------------------------------------------------------------  CONCLUSIONS:  1. Mitral annular calcification, otherwise normal mitral  valve. Severe mitral regurgitation.  2. Calcified trileaflet aortic valve with normal opening.  Mild aortic regurgitation.  3. Severely dilated left atrium.  LA volume index = 73  cc/m2.  4. Severe left ventricular enlargement.  5. Severe global left ventricular systolic dysfunction.  6. Normal right ventricular size with decreased right  ventricular systolic function.  7. Estimated right ventricular systolic pressure equals 65  mm Hg, assuming right atrial pressure equals 10 mm Hg,  consistent with severe pulmonary hypertension.  8. Normal tricuspid valve.  Moderate-severe tricuspid  regurgitation.  *** Compared with echocardiogram of 5/14/2020, more mitral  regurgitation is noted on the current study.  ------------------------------------------------------------------------  Confirmed on  10/21/2020 - 08:35:30 by Ernie Johnson M.D.  ------------------------------------------------------------------------    < end of copied text >

## 2020-10-23 NOTE — PROGRESS NOTE ADULT - PROBLEM SELECTOR PROBLEM 5
Type 2 diabetes mellitus without complication, without long-term current use of insulin
Type 2 diabetes mellitus without complication, without long-term current use of insulin
Hypokalemia
Hypokalemia

## 2020-10-23 NOTE — PROGRESS NOTE ADULT - PROBLEM SELECTOR PROBLEM 4
Hypertension, unspecified type
Hypertension, unspecified type
Type 2 diabetes mellitus without complication, without long-term current use of insulin
Type 2 diabetes mellitus without complication, without long-term current use of insulin

## 2020-10-23 NOTE — PROGRESS NOTE ADULT - PROBLEM SELECTOR PLAN 1
-acute on chronic systolic heart failure, h/o dilated cardiomyopathy   -proBNP 8155  -TTE showed EF of 12% , severe MR, moderate to severe TR, severe pulm HTN   -CXR w/ trace right pleural effusion. lungs clear  -diuresis w/ Lasix 40mg IV BID, now transition to PO    -increased Coreg to 6.25mg BID   -d/c Losartan and started Entresto   -Daily weight, Strict I/O  -Low sodium diet, fluid restriction   -Monitor electrolytes   -Cardiology following

## 2020-10-23 NOTE — PROGRESS NOTE ADULT - ASSESSMENT
Patient is a 70 year old woman with HFrEF, dilated cardiomyopathy (LVEF 25%) with severe functional MR, hypertension, DM2 presents with worsening chest pain and shortness of breath x3 weeks.Intermittent sided chest pain radiating to L arm, exacerbated with activity/ exertion, improves with rest.  Currently chest pain free. She reports dyspnea on exertion, unable to perform daily activities due to increased fatigue and dizziness. She reports poor po intake, though often feels "bloated and gassy", denies n/v/d. She endorsed orthopnea,> 2 pillows when sleeping.  On exam by the admitting Medicine team, the patient was orthopneic.  She was started on IV furosemide with marked improvement in symptoms.    At the time of my examination, the patient appeared clinically and hemodynamically stable.  VSS.  She was on O2 NC at 2L with good O2 saturation.  Physical examination was notable for coarse breath sounds bilaterally.  No LE edema.    Clinically improved with IV diuresis -- good urine output.  Symptoms resolved    At this time, recommend:  1. Continue to wean off supplemental O2 support via NC to RA.  If this is achieved, patient may be discharged home.  2. Change to PO diuresis with furosemide 40 mg BID. Reinforced need to maintain strict I/Os, daily standing weights, fluid restriction to 1.5 L/24 hrs at home on this regimen.    3, Continue Coreg 6.25 BID.  4. Continue Entresto.  5. Continue low dose daily aspirin.  6.  F/U in office or via telemedicine in 1 week ([phone]).

## 2020-10-23 NOTE — DISCHARGE NOTE NURSING/CASE MANAGEMENT/SOCIAL WORK - PATIENT PORTAL LINK FT
You can access the FollowMyHealth Patient Portal offered by [facility] by registering at the following website: [email or web address]. By joining [facility]’s FollowMyHealth portal, you will also be able to view your health information using other applications (apps) compatible with our system.

## 2020-10-23 NOTE — PROGRESS NOTE ADULT - PROBLEM SELECTOR PLAN 2
-likely 2/2 ADHF, last [facility] 5/20 w/ no significant CAD noted  -HsT 13>>11. neg delta. EKG unchanged from previous  -continue ASA and statin

## 2020-10-23 NOTE — DISCHARGE NOTE PROVIDER - NSDCCPCAREPLAN_GEN_ALL_CORE_FT
PRINCIPAL DISCHARGE DIAGNOSIS  Diagnosis: Acute on chronic heart failure  Assessment and Plan of Treatment: continue entresto , continue lasix twice daily   follow up with Dr. Sommer in 2 weeks      SECONDARY DISCHARGE DIAGNOSES  Diagnosis: Dilated cardiomyopathy  Assessment and Plan of Treatment:

## 2020-10-26 ENCOUNTER — NON-APPOINTMENT (OUTPATIENT)
Age: 70
End: 2020-10-26

## 2020-10-26 ENCOUNTER — APPOINTMENT (OUTPATIENT)
Dept: CARE COORDINATION | Facility: HOME HEALTH | Age: 70
End: 2020-10-26
Payer: MEDICARE

## 2020-10-26 VITALS — BODY MASS INDEX: 20.54 KG/M2 | WEIGHT: 131.13 LBS

## 2020-10-26 DIAGNOSIS — I50.9 HEART FAILURE, UNSPECIFIED: ICD-10-CM

## 2020-10-26 DIAGNOSIS — I10 ESSENTIAL (PRIMARY) HYPERTENSION: ICD-10-CM

## 2020-10-26 PROCEDURE — 99348 HOME/RES VST EST LOW MDM 30: CPT | Mod: 95

## 2020-10-26 RX ORDER — FUROSEMIDE 20 MG/1
20 TABLET ORAL DAILY
Qty: 90 | Refills: 3 | Status: DISCONTINUED | COMMUNITY
Start: 2020-05-05 | End: 2020-10-26

## 2020-10-26 RX ORDER — LOSARTAN POTASSIUM 25 MG/1
25 TABLET, FILM COATED ORAL
Qty: 90 | Refills: 3 | Status: DISCONTINUED | COMMUNITY
Start: 2020-04-15 | End: 2020-10-26

## 2020-10-26 NOTE — HISTORY OF PRESENT ILLNESS
[FreeTextEntry1] : "Hospital follow up for CHF" Telehealth visit conducted with pt, spouse Sharif and daughter present. Pt is A&O x 3, Continues on entresto, lasix for CHF- denies SOB, cough, LE edema. weight has been 132 lbs at discharge, now 131.2lbs. Appetite is fair- adhering to 1/.5 L fluid restrictions. On carvedilol for afib/ HTN- BP reported 129/77 on home BP monitor.  [de-identified] : Hospital Course: 70 year old female with PMHx of HFrEF, dilated cardiomyopathy (EF 25%) with severe functional MR, hypertension, DM2 presents with worsening chest pain and shortness of breath x3 weeks.\par . chest pain - associated with SOB/SUBRAMANIAN, \par HFrEF 12%- lasix 40mg bid, net 1.5L, \par - last [facility] 5/20, no CAD noted, on asa and lipitor\par - HsT 13>>11. neg delta. EKG \par - TTE showed worsening EF and MR\par - covid pcr neg\par -CXR w/ trace right pleural effusion. lungs clear\par Transitioned to PO lasix on 10/23 40mg oral twice daily . Patient stable for discharge home on oral diuretic. Follow up with cardiology or [facility] health in 1 week \par 2. HFrEF\par proBNP 8155\par c/w coreg, losartan\par diuresed with IV lasix \par 3.Dilated cardiomyopathy c/b severe MR\par -c/w carvedilol 3.125 BID \par -c/w Losartan 50mg \par -diuresed with IV lasix . Stable for discharge home with outpt follow up in 1 week \par 3. DM2\par hgb1ac 6.8\par ISS\par 4. HTN\par c/w carvedilol 3.125 BID \par -c/w Losartan 50mg \par \par \par 5.DVT ppx lovenox

## 2020-10-26 NOTE — PHYSICAL EXAM
[No Acute Distress] : no acute distress [No Respiratory Distress] : no respiratory distress  [Alert and Oriented x3] : oriented to person, place, and time [de-identified] : +2 ankle edema

## 2020-10-28 ENCOUNTER — NON-APPOINTMENT (OUTPATIENT)
Age: 70
End: 2020-10-28

## 2020-10-29 ENCOUNTER — APPOINTMENT (OUTPATIENT)
Dept: CARDIOLOGY | Facility: CLINIC | Age: 70
End: 2020-10-29
Payer: MEDICARE

## 2020-10-29 VITALS
SYSTOLIC BLOOD PRESSURE: 122 MMHG | DIASTOLIC BLOOD PRESSURE: 79 MMHG | TEMPERATURE: 96.7 F | BODY MASS INDEX: 21.03 KG/M2 | OXYGEN SATURATION: 99 % | HEART RATE: 70 BPM | HEIGHT: 67 IN

## 2020-10-29 VITALS — WEIGHT: 134.25 LBS | BODY MASS INDEX: 21.03 KG/M2

## 2020-10-29 DIAGNOSIS — R60.0 LOCALIZED EDEMA: ICD-10-CM

## 2020-10-29 DIAGNOSIS — R77.8 OTHER SPECIFIED ABNORMALITIES OF PLASMA PROTEINS: ICD-10-CM

## 2020-10-29 DIAGNOSIS — I42.9 CARDIOMYOPATHY, UNSPECIFIED: ICD-10-CM

## 2020-10-29 DIAGNOSIS — R07.9 CHEST PAIN, UNSPECIFIED: ICD-10-CM

## 2020-10-29 DIAGNOSIS — R93.1 ABNORMAL FINDINGS ON DIAGNOSTIC IMAGING OF HEART AND CORONARY CIRCULATION: ICD-10-CM

## 2020-10-29 DIAGNOSIS — E11.9 TYPE 2 DIABETES MELLITUS W/OUT COMPLICATIONS: ICD-10-CM

## 2020-10-29 PROCEDURE — 93000 ELECTROCARDIOGRAM COMPLETE: CPT

## 2020-10-29 PROCEDURE — 99215 OFFICE O/P EST HI 40 MIN: CPT

## 2020-10-29 PROCEDURE — 36415 COLL VENOUS BLD VENIPUNCTURE: CPT

## 2020-10-29 RX ORDER — SACUBITRIL AND VALSARTAN 49; 51 MG/1; MG/1
49-51 TABLET, FILM COATED ORAL TWICE DAILY
Qty: 180 | Refills: 3 | Status: ACTIVE | COMMUNITY
Start: 2020-10-26 | End: 1900-01-01

## 2020-10-29 RX ORDER — FUROSEMIDE 40 MG/1
40 TABLET ORAL TWICE DAILY
Qty: 180 | Refills: 3 | Status: ACTIVE | COMMUNITY
Start: 2020-10-26 | End: 1900-01-01

## 2020-10-29 RX ORDER — ASPIRIN ENTERIC COATED TABLETS 81 MG 81 MG/1
81 TABLET, DELAYED RELEASE ORAL DAILY
Qty: 90 | Refills: 3 | Status: ACTIVE | COMMUNITY
Start: 2020-05-05 | End: 1900-01-01

## 2020-10-30 PROBLEM — R77.8 ELEVATED TROPONIN: Status: ACTIVE | Noted: 2020-05-05

## 2020-10-30 PROBLEM — R07.9 CHEST PAIN: Status: ACTIVE | Noted: 2020-05-05

## 2020-10-30 PROBLEM — R60.0 BILATERAL LOWER EXTREMITY EDEMA: Status: ACTIVE | Noted: 2020-05-05

## 2020-10-30 PROBLEM — E11.9 DIABETES MELLITUS, TYPE II: Status: ACTIVE | Noted: 2020-05-05

## 2020-10-30 PROBLEM — I42.9 CARDIOMYOPATHY: Status: ACTIVE | Noted: 2020-05-05

## 2020-10-30 PROBLEM — R93.1 CARDIAC LV EJECTION FRACTION 21-30%: Status: ACTIVE | Noted: 2020-05-05

## 2020-10-31 LAB
ALBUMIN SERPL ELPH-MCNC: 4.2 G/DL
ALP BLD-CCNC: 130 U/L
ALT SERPL-CCNC: 48 U/L
ANION GAP SERPL CALC-SCNC: 24 MMOL/L
AST SERPL-CCNC: 54 U/L
BASOPHILS # BLD AUTO: 0.06 K/UL
BASOPHILS NFR BLD AUTO: 1.4 %
BILIRUB SERPL-MCNC: 0.6 MG/DL
BUN SERPL-MCNC: 14 MG/DL
CALCIUM SERPL-MCNC: 9.6 MG/DL
CHLORIDE SERPL-SCNC: 102 MMOL/L
CHOLEST SERPL-MCNC: 132 MG/DL
CO2 SERPL-SCNC: 17 MMOL/L
CREAT SERPL-MCNC: 0.78 MG/DL
EOSINOPHIL # BLD AUTO: 0.07 K/UL
EOSINOPHIL NFR BLD AUTO: 1.7 %
ESTIMATED AVERAGE GLUCOSE: 160 MG/DL
GLUCOSE SERPL-MCNC: 76 MG/DL
HBA1C MFR BLD HPLC: 7.2 %
HCT VFR BLD CALC: 46.6 %
HDLC SERPL-MCNC: 68 MG/DL
HGB BLD-MCNC: 13.8 G/DL
IMM GRANULOCYTES NFR BLD AUTO: 0 %
LDLC SERPL CALC-MCNC: 50 MG/DL
LYMPHOCYTES # BLD AUTO: 1.93 K/UL
LYMPHOCYTES NFR BLD AUTO: 46.6 %
MAN DIFF?: NORMAL
MCHC RBC-ENTMCNC: 25.3 PG
MCHC RBC-ENTMCNC: 29.6 GM/DL
MCV RBC AUTO: 85.3 FL
MONOCYTES # BLD AUTO: 0.44 K/UL
MONOCYTES NFR BLD AUTO: 10.6 %
NEUTROPHILS # BLD AUTO: 1.64 K/UL
NEUTROPHILS NFR BLD AUTO: 39.7 %
NONHDLC SERPL-MCNC: 64 MG/DL
NT-PROBNP SERPL-MCNC: 2375 PG/ML
PLATELET # BLD AUTO: 304 K/UL
POTASSIUM SERPL-SCNC: 4.5 MMOL/L
PROT SERPL-MCNC: 7.5 G/DL
RBC # BLD: 5.46 M/UL
RBC # FLD: 15.9 %
SODIUM SERPL-SCNC: 142 MMOL/L
TRIGL SERPL-MCNC: 70 MG/DL
TSH SERPL-ACNC: 1.9 UIU/ML
WBC # FLD AUTO: 4.14 K/UL

## 2020-11-01 NOTE — REVIEW OF SYSTEMS
[Negative] : Heme/Lymph [Shortness Of Breath] : no shortness of breath [Dyspnea on exertion] : not dyspnea during exertion [Chest  Pressure] : no chest pressure

## 2020-11-01 NOTE — PHYSICAL EXAM
[General Appearance - Well Developed] : well developed [Normal Appearance] : normal appearance [Normal Conjunctiva] : the conjunctiva exhibited no abnormalities [Eyelids - No Xanthelasma] : the eyelids demonstrated no xanthelasmas [Normal Oral Mucosa] : normal oral mucosa [No Oral Pallor] : no oral pallor [No Oral Cyanosis] : no oral cyanosis [Normal Jugular Venous A Waves Present] : normal jugular venous A waves present [Normal Jugular Venous V Waves Present] : normal jugular venous V waves present [No Jugular Venous Wright A Waves] : no jugular venous wright A waves [Respiration, Rhythm And Depth] : normal respiratory rhythm and effort [Exaggerated Use Of Accessory Muscles For Inspiration] : no accessory muscle use [Auscultation Breath Sounds / Voice Sounds] : lungs were clear to auscultation bilaterally [Heart Rate And Rhythm] : heart rate and rhythm were normal [Heart Sounds] : normal S1 and S2 [Abdomen Soft] : soft [Abdomen Tenderness] : non-tender [Abdomen Mass (___ Cm)] : no abdominal mass palpated [Abnormal Walk] : normal gait [Gait - Sufficient For Exercise Testing] : the gait was sufficient for exercise testing [Nail Clubbing] : no clubbing of the fingernails [Cyanosis, Localized] : no localized cyanosis [Petechial Hemorrhages (___cm)] : no petechial hemorrhages [Skin Color & Pigmentation] : normal skin color and pigmentation [] : no rash [No Venous Stasis] : no venous stasis [Skin Lesions] : no skin lesions [No Skin Ulcers] : no skin ulcer [No Xanthoma] : no  xanthoma was observed [Oriented To Time, Place, And Person] : oriented to person, place, and time [Affect] : the affect was normal [Mood] : the mood was normal [No Anxiety] : not feeling anxious [FreeTextEntry1] : 3/6 SM

## 2020-11-01 NOTE — REASON FOR VISIT
[FreeTextEntry1] : Ms Godoy was last evaluated in September 2020.  She reports improved symptoms since hospital discharge.  She reports remaining asymptomatic from the cardiopulmonary perspective.  Reports compliance with medications.  Will increase dose of Entresto.  \par \par Of note, she is moving to [place] to join her family.  Will be requesting test results.\par \par On her recent hospitalization, she had undergone LHC which revealed no obstructive CAD.  Further, because she had already been diuresed, her LVEDP was 8.  Follow-up echocardiogram prior to discharge noted moderate MR.  She reports having no exertional limitations.\par \par She has remained compliant with her medications. She appeared euvolemic on exam today.\par \par F/U in 3 months.  Echocardiogram prior to next visit.\par \par Of noted, she was diagnosed with ADHF/NICM, as this was a new finding of cardiomyopathy, severe functional MR on her recent [facility] admission.  She was medically managed as the hospital was under lockdown due to the ongoing COVID pandemic. \par \par Hospital Course: \par Discharge Date	21-Apr-2020 \par Admission Date	19-Apr-2020 00:59 \par Reason for Admission	Left sided chest pain, SOB and palpitations \par Hospital Course	 \par HPI: \par 71 y/o F with PMH of DM type II, HTN presents to ED with the compliant of left \par sided chest pain, palpitations, SUBRAMANIAN since Wednesday. As per the patient she was \par in her usual state of health and developed left sided chest pain gradually. \par Patient described the chest pain as a heaviness sensation, intermittent, \par aggravated with exertion and relief with rest, with radiation of the chest pain \par to the left shoulder, with a severity of 5/10. Patient also endorsed of SOB and \par SUBRAMANIAN that has been chronic for her. Patient stated that she has chronic Hx of \par bilateral LE swelling and endorsed of 3 pillow orthopnea. Patient denied any \par prior cardiac workup with NST or TTE. Patient denied any fevers, chills, \par N/V/D/C, abdominal pain, dysuria, melena, hematochezia, recent travel, sick \par contact, pleuritic or positional chest pain. \par \par On ED admission EKG revealed NSR at a rate of 92 with possible LAE, LBBB with \par QTc of 504, CE x 1: Trop: 18, Na: 133, K: 5.6->severely hemolyzed, Gluc: 169, \par AST: 150, ALT: 110. Prelim CXR: There is blunting of the perihilar vessels \par concerning for mild pulmonary edema. Small left pleural effusion. In the ED \par patient was given Aspirin 325mg. When examined patient is resting in the \par stretcher and denied any current chest pain or SOB. \par \par Hospital Course: \par Admitted for presumed NSTEMI. Troponins continued to increase, loaded with ASA \par in the ED and Plavix on the floor. Held off on heparin gtt as per cards given \par that pt was not having any chest pain and troponins were mildly elevated. TTE \par showed EF 25%, severely dilated LV. Cardiology recommending medical management \par with coreg, losartan, DAPT, atorvastatin. Patient made aware to follow up with \par cards within a week, [phone], within 1 week post discharge followup via \par telehealth. \par \par \par Med Reconciliation: \par Medication Reconciliation Status	Admission Reconciliation is Completed \par Discharge Reconciliation is Completed \par \par Discharge Medications	Aspirin Enteric Coated 81 mg oral delayed release tablet: \par 1 tab(s) orally once a day \par atorvastatin 40 mg oral tablet: 1 tab(s) orally once a day (at bedtime) \par metFORMIN 500 mg oral tablet: 1 tab(s) orally 2 times a day \par \par \par Care Plan/Procedures: \par Goal(s)	To get better and follow your care plan as instructed. \par Discharge Diagnoses, Assessment and Plan of Treatment	PRINCIPAL DISCHARGE \par DIAGNOSIS \par Diagnosis: Chest pain \par Assessment and Plan of Treatment: You were having chest pain due to a lack of \par blood flow to the heart muscle, as indicated by the laboratory tests. You had \par an echocardiogram which showed that the heart muscle is severely weakened. You \par were treated with aspirin and clopidogrel (Plavix). Please continue to take \par your medications as prescribed and follow up with your cardiologist and your \par primary care doctor. Please follow up within a week, with cardiology, please \par call [phone] to schedule an appointment \par \par

## 2020-11-02 ENCOUNTER — NON-APPOINTMENT (OUTPATIENT)
Age: 70
End: 2020-11-02

## 2020-11-09 ENCOUNTER — NON-APPOINTMENT (OUTPATIENT)
Age: 70
End: 2020-11-09

## 2020-11-17 ENCOUNTER — NON-APPOINTMENT (OUTPATIENT)
Age: 70
End: 2020-11-17

## 2021-03-17 ENCOUNTER — APPOINTMENT (OUTPATIENT)
Dept: CARDIOLOGY | Facility: CLINIC | Age: 71
End: 2021-03-17

## 2021-06-03 NOTE — DISCHARGE NOTE PROVIDER - HOSPITAL COURSE
70 year old female with history of HFrEF, dilated cardiomyopathy (EF 25%)  c/b severe MR, HTN, Diabetes, presented to ED on 5/12/20 with chest pain, SOB, lightheadedness.              Cardiac Catheterization     CORONARY VESSELS: The coronary circulation is right dominant.    LM:   --  LM: Angiography showed minor luminal irregularities with no flow    limiting lesions.    LAD:   --  LAD: Angiography showed minor luminal irregularities with no    flow limiting lesions.    --  D1: Angiography showed minor luminal irregularities with no flow    limiting lesions.    CX:   --  Circumflex: Angiography showed minor luminal irregularities with    no flow limiting lesions.    RCA:   --  RCA: Angiography showed mild atherosclerosis with no flow    limiting lesions.    --  RPDA: Normal.    --  RPLS: Normal.    COMPLICATIONS: No complications occurred during the cath lab visit.    DIAGNOSTIC IMPRESSIONS: There is mild irregularity of the coronary anatomy.    Right heart cath hemodynamics measurements as listed indicate well    compesated state.    Prepared and signed by    Estela Harris M.D.    Signed 05/14/2020 13:35:01 JARRED (acute kidney injury) 70 year old female with history of HFrEF, dilated cardiomyopathy (EF 25%)  c/b severe MR, HTN, Diabetes, presented to ED on 5/12/20 with chest pain, SOB, lightheadedness.  Improved with diuresis.          Cardiac Catheterization     CORONARY VESSELS: The coronary circulation is right dominant.    LM:   --  LM: Angiography showed minor luminal irregularities with no flow    limiting lesions.    LAD:   --  LAD: Angiography showed minor luminal irregularities with no    flow limiting lesions.    --  D1: Angiography showed minor luminal irregularities with no flow    limiting lesions.    CX:   --  Circumflex: Angiography showed minor luminal irregularities with    no flow limiting lesions.    RCA:   --  RCA: Angiography showed mild atherosclerosis with no flow    limiting lesions.    --  RPDA: Normal.    --  RPLS: Normal.    COMPLICATIONS: No complications occurred during the cath lab visit.    DIAGNOSTIC IMPRESSIONS: There is mild irregularity of the coronary anatomy.    Right heart cath hemodynamics measurements as listed indicate well    compesated state.    Prepared and signed by    Estela Harris M.D.    Signed 05/14/2020 13:35:01                  < from: Transthoracic Echocardiogram (04.19.20 @ 08:36) >        Patient name: LALY MAKI    YOB: 1950   Age: 70 (F)   MR#: [id number]    Study Date: 4/19/2020    Location: O/PSonographer: Soledad Howe RDCS    Study quality: Technically good    Referring Physician: Not Available Doctor, MD    Blood Pressure: 122/75 mmHg    Height: 165 cm    Weight: 55 kg    BSA: 1.6 m2    Heart Rhythm: Normal sinus    Heart Rate: 64 mmHg    ------------------------------------------------------------------------    PROCEDURE: Transthoracic echocardiogram with 2-D, M-Mode    and complete spectral and color flow Doppler.    INDICATION: Chest pain, unspecified (R07.9)    ------------------------------------------------------------------------    DIMENSIONS:    Dimensions:     Normal Values:    LA:     4.4 cm    2.0 - 4.0 cm    Ao:     3.8 cm    2.0 - 3.8 cm    SEPTUM: 0.9 cm    0.6 - 1.2 cm    PWT:    0.8 cm    0.6 - 1.1 cm    LVIDd:  6.6 cm    3.0 - 5.6 cm    LVIDs:  6.2 cm    1.8 - 4.0 cm    Derived Variables:    LVMI: 147 g/m2    RWT: 0.24    Ejection Fraction (Visual Estimate): 25 %    Peak Velocity (m/sec): TV=3.0    ------------------------------------------------------------------------    OBSERVATIONS:    Mitral Valve: Normal appearing mitral valve leaflets.        Severe functional mitral regurgitation.    Aortic Root: Normal size aortic root.    Aortic Valve: Normal aortic valve. Mild aortic    regurgitation.    Left Atrium: Severely dilated left atrium.    Left Ventricle: Severe left ventricular enlargement.    Severely dilated left ventricle. Diffuse hypokinesis.    Severe reversible diastolic dysfunction (Stage III).    Right Heart: Normal right atrium. Normal right ventricular    size and function. Normal tricuspid valve.  Mild tricuspid    regurgitation. Normal pulmonic valve. Mild pulmonic    regurgitation.    Pericardium/PleuraNormal pericardium with trace pericardial    effusion.    Hemodynamic: Estimated right atrial pressure is mildly    elevated.    Mild pulmonary hypertension. Estimated PASP 44 mmHg.    No PFO seen with color Doppler.    ------------------------------------------------------------------------    CONCLUSIONS:    Severely dilated left ventricle. Diffuse hypokinesis.    Severe functional mitral regurgitation.    Mild pulmonary hypertension.    ------------------------------------------------------------------------    Confirmed on  4/19/2020 - 10:32:55 by Dontae Worley M.D.    ------------------------------------------------------------------------

## 2021-08-13 ENCOUNTER — RX RENEWAL (OUTPATIENT)
Age: 71
End: 2021-08-13

## 2021-08-13 RX ORDER — METFORMIN HYDROCHLORIDE 500 MG/1
500 TABLET, COATED ORAL
Qty: 180 | Refills: 3 | Status: ACTIVE | COMMUNITY
Start: 2019-10-21 | End: 1900-01-01

## 2021-11-14 ENCOUNTER — RX RENEWAL (OUTPATIENT)
Age: 71
End: 2021-11-14

## 2021-11-14 RX ORDER — ATORVASTATIN CALCIUM 40 MG/1
40 TABLET, FILM COATED ORAL
Qty: 90 | Refills: 3 | Status: ACTIVE | COMMUNITY
Start: 2020-04-21 | End: 1900-01-01

## 2021-11-15 ENCOUNTER — RX RENEWAL (OUTPATIENT)
Age: 71
End: 2021-11-15

## 2021-11-15 RX ORDER — CARVEDILOL 6.25 MG/1
6.25 TABLET, FILM COATED ORAL TWICE DAILY
Qty: 180 | Refills: 3 | Status: ACTIVE | COMMUNITY
Start: 2020-04-21 | End: 1900-01-01

## 2022-10-01 NOTE — ED CDU PROVIDER DISPOSITION NOTE - CLINICAL COURSE
69 y/o F with CHF (EF25%) severe MR, HTN, DM, p/w intermittent CP and sob x 3 weeks. Pt states her symptoms had improved until 3 weeks ago when she began to have intermittent Lt side chest pain described as pressure w/ sob. Follows Dr. marmolejo. She states she is compliant with her home CHF medications losartan lasix, carvedilol. She reports having some postural lightheadedness for several days. denies syncope. She denies weight gain or SOB. She was was ssen in the ed w/ initial presentation of chest pain and SOB. In ED, trop 11/13, pBNP 8155, CXR reveal cardiomegaly and was placed in CDU for ECHO and tele monitoring.  echo showed worsened CHF, cardiology consulted recommended admission
full weight-bearing

## 2023-03-12 NOTE — ED ADULT NURSE NOTE - RESPIRATORY RATE (BREATHS/MIN)
" [place]  Viry Obrien  : 1996  MRN: [id number]  CSN: [id number]    History and Physical  Chief Complaint   Patient presents with   • Rupture of Membranes       Subjective   Viry Obrien is a 26 y.o. year old  with an Estimated Date of Delivery: 3/17/23 currently at 39w2d presenting with LOF around ~ 2am.  She also reports no complaints.    Prenatal care has been with Dr. Quintero.  It has been benign.    OB History    Para Term  AB Living   1 0 0 0 0 0   SAB IAB Ectopic Molar Multiple Live Births   0 0 0 0 0 0      # Outcome Date GA Lbr Aaron/2nd Weight Sex Delivery Anes PTL Lv   1 Current               Obstetric Comments          No history of abnormal paps or STIs     Past Medical History:   Diagnosis Date   • Iron deficiency anemia during pregnancy 2023     Past Surgical History:   Procedure Laterality Date   • WISDOM TOOTH EXTRACTION         No Known Allergies  Social History    Tobacco Use      Smoking status: Never      Smokeless tobacco: Never    Review of Systems   Constitutional: Negative for chills and fever.   HENT: Negative for congestion and sore throat.    Respiratory: Negative for shortness of breath and wheezing.    Cardiovascular: Negative for chest pain and palpitations.   Gastrointestinal: Positive for abdominal pain. Negative for nausea and vomiting.   Genitourinary: Negative for frequency, vaginal bleeding and vaginal pain.   Musculoskeletal: Negative for back pain and myalgias.   Neurological: Negative for dizziness, light-headedness and headaches.   Psychiatric/Behavioral: Negative for confusion. The patient is not nervous/anxious.          Objective   /64 (BP Location: Right arm, Patient Position: Sitting)   Pulse 85   Temp 98.3 °F (36.8 °C) (Oral)   Resp 16   Ht 160 cm (63\")   Wt 107 kg (235 lb)   LMP 06/10/2022   BMI 41.63 kg/m²   General: well developed; well nourished  no acute distress   Heart: Not performed.   Lungs: " breathing is unlabored   Abdomen: soft, non-tender; no masses  no umbilical or inguinal hernias are present  no hepato-splenomegaly   FHT's: reassuring and category 1   Cervix: was checked (by RN): 2 cm / 80 % / -3   Presentation: cephalic   Contractions: regular every 2 minutes     Prenatal Labs  Lab Results   Component Value Date    HGB 12.7 03/12/2023    RUBELLAABIGG immune 08/22/2022    HEPBSAG Negative 08/22/2022    ABSCRN Negative 03/12/2023    MCD2ZVX6 nonreactive 08/22/2022    HEPCVIRUSABY Non-Reactive 12/01/2022     12/15/2022    STREPGPB negative 02/16/2023    URINECX No growth 08/22/2022    CHLAMNAA negative 11/07/2022    NGONORRHON negative 11/07/2022       Current Labs Reviewed   CBC:   Lab Results   Component Value Date    WBC 9.40 03/12/2023    HGB 12.7 03/12/2023    HCT 39.7 03/12/2023     03/12/2023     Pre-eclampsia Panel:   Lab Results   Component Value Date    ALKPHOS 142 (H) 03/12/2023    AST 15 03/12/2023    ALT 9 03/12/2023    BILITOT 0.2 03/12/2023     03/12/2023    URICACID 4.3 03/12/2023    CREATININE 0.33 (L) 03/12/2023          Assessment   1. IUP at 39w2d  2. Group B strep status: negative  3. Premature rupture of membranes      Plan   1. Cervical avery and pitocin   2. Okay for epidural at patient request     Winsome Quintero MD  3/12/2023  09:33 EDT      18

## 2023-08-10 NOTE — CONSULT NOTE ADULT - SUBJECTIVE AND OBJECTIVE BOX
Date of Admission: 4/19/2020    CHIEF COMPLAINT: Chest pain    HISTORY OF PRESENT ILLNESS:  This is a 70yoF w/ PMHx DM, HTN presents with left sided chest pain with radiation to left shoulder that occurred yesterday lasting for 1 hour not associated with any other symptoms.  Denies any cardiac history and during examination, patient disinterested in providing more patient history.  Currently denies any chest pain and states her symptoms have resolved.  First hsT 18 >> 52.  EKG LBBB (no old EKG to compare it to), does not meet sgarbossa criteria.      Allergies  penicillin (Unknown)    MEDICATIONS:  aspirin enteric coated 81 milliGRAM(s) Oral daily  losartan 50 milliGRAM(s) Oral daily  dextrose 40% Gel 15 Gram(s) Oral once PRN  dextrose 50% Injectable 12.5 Gram(s) IV Push once  dextrose 50% Injectable 25 Gram(s) IV Push once  dextrose 50% Injectable 25 Gram(s) IV Push once  glucagon  Injectable 1 milliGRAM(s) IntraMuscular once PRN  insulin lispro (HumaLOG) corrective regimen sliding scale   SubCutaneous three times a day before meals  insulin lispro (HumaLOG) corrective regimen sliding scale   SubCutaneous at bedtime  dextrose 5%. 1000 milliLiter(s) IV Continuous <Continuous>    PAST MEDICAL & SURGICAL HISTORY:  HTN (hypertension)  DM (diabetes mellitus): Type II  No significant past surgical history    FAMILY HISTORY:  No pertinent family history in first degree relatives    REVIEW OF SYSTEMS:  See HPI. Otherwise, 10 point ROS done and otherwise negative.    PHYSICAL EXAM:  T(C): 36.9 (04-19-20 @ 03:53), Max: 36.9 (04-19-20 @ 03:53)  HR: 64 (04-19-20 @ 03:53) (64 - 96)  BP: 135/83 (04-19-20 @ 03:53) (135/83 - 167/98)  RR: 14 (04-19-20 @ 03:53) (14 - 18)  SpO2: 100% (04-19-20 @ 03:53) (99% - 100%)  Wt(kg): --  I&O's Summary    Appearance: Normal	  HEENT:   Normal oral mucosa, PERRL, EOMI	  Lymphatic: No lymphadenopathy  Cardiovascular: Normal S1 S2, No JVD, No murmurs, No edema  Respiratory: Lungs clear to auscultation	  Psychiatry: A & O x 3, Mood & affect appropriate  Gastrointestinal:  Soft, Non-tender, + BS	  Skin: No rashes, No ecchymoses, No cyanosis	  Neurologic: Non-focal  Extremities: Normal range of motion, No clubbing, cyanosis or edema  Vascular: Peripheral pulses palpable 2+ bilaterally    LABS:	 	  CBC Full  -  ( 18 Apr 2020 23:24 )  WBC Count : 5.71 K/uL  Hemoglobin : 12.9 g/dL  Hematocrit : 40.3 %  Platelet Count - Automated : 221 K/uL  Mean Cell Volume : 85.6 fL  Mean Cell Hemoglobin : 27.4 pg  Mean Cell Hemoglobin Concentration : 32.0 %  Auto Neutrophil # : 3.82 K/uL  Auto Lymphocyte # : 1.34 K/uL  Auto Monocyte # : 0.47 K/uL  Auto Eosinophil # : 0.02 K/uL  Auto Basophil # : 0.04 K/uL  Auto Neutrophil % : 66.8 %  Auto Lymphocyte % : 23.5 %  Auto Monocyte % : 8.2 %  Auto Eosinophil % : 0.4 %  Auto Basophil % : 0.7 %    04-18    133<L>  |  100  |  16  ----------------------------<  169<H>  5.6<H>   |  22  |  0.82    Ca    9.6      18 Apr 2020 23:24    TPro  7.9  /  Alb  4.1  /  TBili  0.5  /  DBili  x   /  AST  150<H>  /  ALT  110<H>  /  AlkPhos  111  04-18    TSH: Thyroid Stimulating Hormone, Serum: 2.37 uIU/mL (04-18 @ 23:24)    CARDIAC MARKERS:  CKMB: 3.54 ng/mL (04-18 @ 23:24)  CKMB Relative Index: 2.2 (04-18 @ 23:24) (4) no impairment

## 2024-03-25 NOTE — ED PROVIDER NOTE - DATE/TIME 1
Problem: Adult Inpatient Plan of Care  Goal: Plan of Care Review  Outcome: Ongoing, Progressing  Goal: Patient-Specific Goal (Individualized)  Outcome: Ongoing, Progressing  Goal: Absence of Hospital-Acquired Illness or Injury  Outcome: Ongoing, Progressing  Goal: Optimal Comfort and Wellbeing  Outcome: Ongoing, Progressing  Goal: Readiness for Transition of Care  Outcome: Ongoing, Progressing      12-May-2020 14:00
